# Patient Record
Sex: MALE | Race: WHITE | NOT HISPANIC OR LATINO | Employment: FULL TIME | URBAN - NONMETROPOLITAN AREA
[De-identification: names, ages, dates, MRNs, and addresses within clinical notes are randomized per-mention and may not be internally consistent; named-entity substitution may affect disease eponyms.]

---

## 2017-01-09 DIAGNOSIS — I10 ESSENTIAL HYPERTENSION: ICD-10-CM

## 2017-01-09 RX ORDER — LISINOPRIL 40 MG/1
TABLET ORAL
Qty: 30 TABLET | Refills: 0 | OUTPATIENT
Start: 2017-01-09

## 2017-01-11 DIAGNOSIS — I10 ESSENTIAL HYPERTENSION: ICD-10-CM

## 2017-01-11 RX ORDER — LISINOPRIL 40 MG/1
TABLET ORAL
Qty: 30 TABLET | Refills: 0 | OUTPATIENT
Start: 2017-01-11

## 2017-01-12 DIAGNOSIS — I10 ESSENTIAL HYPERTENSION: ICD-10-CM

## 2017-01-12 RX ORDER — LISINOPRIL 40 MG/1
40 TABLET ORAL DAILY
Qty: 30 TABLET | Refills: 2 | Status: SHIPPED | OUTPATIENT
Start: 2017-01-12 | End: 2017-03-16

## 2017-01-12 NOTE — TELEPHONE ENCOUNTER
This was refilled ×2.  Let patient know will need to be seen for further renewals.  He was requested to follow-up regarding medication changes.

## 2017-03-04 ENCOUNTER — HOSPITAL ENCOUNTER (EMERGENCY)
Facility: HOSPITAL | Age: 47
Discharge: HOME | End: 2017-03-04
Attending: EMERGENCY MEDICINE | Admitting: EMERGENCY MEDICINE

## 2017-03-04 ENCOUNTER — APPOINTMENT (OUTPATIENT)
Dept: GENERAL RADIOLOGY | Facility: HOSPITAL | Age: 47
End: 2017-03-04

## 2017-03-04 VITALS
RESPIRATION RATE: 16 BRPM | BODY MASS INDEX: 35.07 KG/M2 | WEIGHT: 245 LBS | OXYGEN SATURATION: 99 % | SYSTOLIC BLOOD PRESSURE: 137 MMHG | HEART RATE: 67 BPM | HEIGHT: 70 IN | TEMPERATURE: 97.8 F | DIASTOLIC BLOOD PRESSURE: 79 MMHG

## 2017-03-04 DIAGNOSIS — S86.002A INJURY OF LEFT ACHILLES TENDON, INITIAL ENCOUNTER: Primary | ICD-10-CM

## 2017-03-04 PROCEDURE — 73610 X-RAY EXAM OF ANKLE: CPT | Performed by: RADIOLOGY

## 2017-03-04 PROCEDURE — 99283 EMERGENCY DEPT VISIT LOW MDM: CPT

## 2017-03-04 PROCEDURE — 73610 X-RAY EXAM OF ANKLE: CPT

## 2017-03-04 RX ORDER — IBUPROFEN 800 MG/1
800 TABLET ORAL EVERY 8 HOURS PRN
Qty: 20 TABLET | Refills: 0 | Status: SHIPPED | OUTPATIENT
Start: 2017-03-04 | End: 2017-03-16 | Stop reason: SDUPTHER

## 2017-03-04 RX ORDER — HYDROCODONE BITARTRATE AND ACETAMINOPHEN 7.5; 325 MG/1; MG/1
1 TABLET ORAL EVERY 6 HOURS PRN
Qty: 10 TABLET | Refills: 0 | Status: SHIPPED | OUTPATIENT
Start: 2017-03-04 | End: 2017-03-16 | Stop reason: SDUPTHER

## 2017-03-04 NOTE — ED PROVIDER NOTES
Subjective   HPI Comments: 46-year-old male who presents to the ED today with a left ankle injury.  He states he was stepping down the stairs and felt something give out on the back of his left ankle which then caused him to fall.  About 30 minutes prior to arrival.  He denies any previous injury to this ankle he has not taken any medications for his symptoms.    Patient is a 46 y.o. male presenting with lower extremity pain.   History provided by:  Patient  Lower Extremity Issue   Location:  Ankle  Time since incident:  30 minutes  Injury: yes    Mechanism of injury comment:  Stepping down on the stairs and ankle gave out  Ankle location:  L ankle  Pain details:     Quality:  Throbbing and aching    Radiates to:  Does not radiate    Severity:  Moderate    Onset quality:  Sudden    Timing:  Constant    Progression:  Worsening  Chronicity:  New  Dislocation: no    Prior injury to area:  No  Relieved by:  Nothing  Worsened by:  Bearing weight  Ineffective treatments:  None tried  Associated symptoms: decreased ROM    Associated symptoms: no back pain, no fatigue, no fever, no itching, no muscle weakness, no neck pain, no numbness, no stiffness, no swelling and no tingling        Review of Systems   Constitutional: Negative for fatigue and fever.   HENT: Negative.    Eyes: Negative.    Respiratory: Negative.    Cardiovascular: Negative.    Gastrointestinal: Negative.    Genitourinary: Negative.    Musculoskeletal: Positive for arthralgias. Negative for back pain, neck pain and stiffness.   Skin: Negative.  Negative for itching.   Neurological: Negative.    Psychiatric/Behavioral: Negative.    All other systems reviewed and are negative.      Past Medical History   Diagnosis Date   • Arthritis      HAND AND KNEE   • Hypertension    • Poison ivy        Allergies   Allergen Reactions   • Penicillins        Past Surgical History   Procedure Laterality Date   • Hernia repair  2001     Approx.       Family History   Problem  Relation Age of Onset   • Heart disease Mother      CARDIAC DISORDER   • Hypertension Mother    • Hyperlipidemia Mother    • Heart disease Father      VALVULAR   • Diabetes Maternal Grandmother        Social History     Social History   • Marital status:      Spouse name: N/A   • Number of children: N/A   • Years of education: N/A     Social History Main Topics   • Smoking status: Never Smoker   • Smokeless tobacco: Never Used   • Alcohol use No   • Drug use: No   • Sexual activity: Not Asked     Other Topics Concern   • None     Social History Narrative           Objective   Physical Exam   Constitutional: He is oriented to person, place, and time. He appears well-developed and well-nourished. No distress.   HENT:   Head: Normocephalic and atraumatic.   Right Ear: External ear normal.   Left Ear: External ear normal.   Nose: Nose normal.   Mouth/Throat: Oropharynx is clear and moist.   Eyes: Conjunctivae and EOM are normal. Pupils are equal, round, and reactive to light.   Neck: Normal range of motion. Neck supple.   Cardiovascular: Normal rate, regular rhythm, normal heart sounds and intact distal pulses.    Pulmonary/Chest: Effort normal and breath sounds normal. No respiratory distress.   Abdominal: Soft. Bowel sounds are normal. There is no tenderness.   Musculoskeletal: He exhibits tenderness (to posterior left ankle over the achilles tendon area). He exhibits no edema or deformity.   Pt has positive Manzanares's Test on the left, consistent with possible achilles tendon rupture   Neurological: He is alert and oriented to person, place, and time.   Skin: Skin is warm and dry.   Psychiatric: He has a normal mood and affect. His behavior is normal. Judgment and thought content normal.   Nursing note and vitals reviewed.      Splint Application  Date/Time: 3/4/2017 11:19 AM  Performed by: JERI STEVEN  Authorized by: CHARITO JARA   Location details: left ankle  Splint type: ortho  boot.  Post-procedure: The splinted body part was neurovascularly unchanged following the procedure.  Patient tolerance: Patient tolerated the procedure well with no immediate complications  Comments: Fitted for crutches as well               ED Course  ED Course   Comment By Time   No acute fracture on x-ray, exam is consistent with an achilles tendon injury, pt placed in ortho boot with crutches.  Will have him follow up with ortho. HUSAM Alvares 03/04 1119                  MDM  Number of Diagnoses or Management Options  Injury of left Achilles tendon, initial encounter:      Amount and/or Complexity of Data Reviewed  Tests in the radiology section of CPT®: ordered and reviewed    Patient Progress  Patient progress: stable      Final diagnoses:   Injury of left Achilles tendon, initial encounter            HUSAM Alvares  03/04/17 1120

## 2017-03-04 NOTE — DISCHARGE INSTRUCTIONS

## 2017-03-16 ENCOUNTER — OFFICE VISIT (OUTPATIENT)
Dept: FAMILY MEDICINE CLINIC | Facility: CLINIC | Age: 47
End: 2017-03-16

## 2017-03-16 VITALS
HEART RATE: 72 BPM | SYSTOLIC BLOOD PRESSURE: 162 MMHG | WEIGHT: 257.2 LBS | TEMPERATURE: 97.7 F | HEIGHT: 70 IN | DIASTOLIC BLOOD PRESSURE: 87 MMHG | BODY MASS INDEX: 36.82 KG/M2

## 2017-03-16 DIAGNOSIS — S86.002S ACHILLES TENDON INJURY, LEFT, SEQUELA: ICD-10-CM

## 2017-03-16 DIAGNOSIS — I10 ESSENTIAL HYPERTENSION: Primary | ICD-10-CM

## 2017-03-16 LAB
ALBUMIN SERPL-MCNC: 4.2 G/DL (ref 3.5–5)
ALBUMIN/GLOB SERPL: 1.4 G/DL (ref 1.5–2.5)
ALP SERPL-CCNC: 79 U/L (ref 40–129)
ALT SERPL W P-5'-P-CCNC: 36 U/L (ref 10–44)
ANION GAP SERPL CALCULATED.3IONS-SCNC: 5.5 MMOL/L (ref 3.6–11.2)
AST SERPL-CCNC: 32 U/L (ref 10–34)
BASOPHILS # BLD AUTO: 0.02 10*3/MM3 (ref 0–0.3)
BASOPHILS NFR BLD AUTO: 0.2 % (ref 0–2)
BILIRUB SERPL-MCNC: 0.3 MG/DL (ref 0.2–1.8)
BUN BLD-MCNC: 19 MG/DL (ref 7–21)
BUN/CREAT SERPL: 16.8 (ref 7–25)
CALCIUM SPEC-SCNC: 9.3 MG/DL (ref 7.7–10)
CHLORIDE SERPL-SCNC: 106 MMOL/L (ref 99–112)
CO2 SERPL-SCNC: 30.5 MMOL/L (ref 24.3–31.9)
CREAT BLD-MCNC: 1.13 MG/DL (ref 0.43–1.29)
DEPRECATED RDW RBC AUTO: 40.5 FL (ref 37–54)
EOSINOPHIL # BLD AUTO: 0.1 10*3/MM3 (ref 0–0.7)
EOSINOPHIL NFR BLD AUTO: 1.2 % (ref 0–5)
ERYTHROCYTE [DISTWIDTH] IN BLOOD BY AUTOMATED COUNT: 12.6 % (ref 11.5–14.5)
GFR SERPL CREATININE-BSD FRML MDRD: 70 ML/MIN/1.73
GLOBULIN UR ELPH-MCNC: 2.9 GM/DL
GLUCOSE BLD-MCNC: 118 MG/DL (ref 70–110)
HCT VFR BLD AUTO: 32.8 % (ref 42–52)
HGB BLD-MCNC: 10.8 G/DL (ref 14–18)
IMM GRANULOCYTES # BLD: 0.03 10*3/MM3 (ref 0–0.03)
IMM GRANULOCYTES NFR BLD: 0.4 % (ref 0–0.5)
LYMPHOCYTES # BLD AUTO: 1.28 10*3/MM3 (ref 1–3)
LYMPHOCYTES NFR BLD AUTO: 15.6 % (ref 21–51)
MCH RBC QN AUTO: 29.3 PG (ref 27–33)
MCHC RBC AUTO-ENTMCNC: 32.9 G/DL (ref 33–37)
MCV RBC AUTO: 89.1 FL (ref 80–94)
MONOCYTES # BLD AUTO: 0.53 10*3/MM3 (ref 0.1–0.9)
MONOCYTES NFR BLD AUTO: 6.5 % (ref 0–10)
NEUTROPHILS # BLD AUTO: 6.23 10*3/MM3 (ref 1.4–6.5)
NEUTROPHILS NFR BLD AUTO: 76.1 % (ref 30–70)
OSMOLALITY SERPL CALC.SUM OF ELEC: 286.5 MOSM/KG (ref 273–305)
PLATELET # BLD AUTO: 247 10*3/MM3 (ref 130–400)
PMV BLD AUTO: 10.6 FL (ref 6–10)
POTASSIUM BLD-SCNC: 3.4 MMOL/L (ref 3.5–5.3)
PROT SERPL-MCNC: 7.1 G/DL (ref 6–8)
RBC # BLD AUTO: 3.68 10*6/MM3 (ref 4.7–6.1)
SODIUM BLD-SCNC: 142 MMOL/L (ref 135–153)
WBC NRBC COR # BLD: 8.19 10*3/MM3 (ref 4.5–12.5)

## 2017-03-16 PROCEDURE — 80053 COMPREHEN METABOLIC PANEL: CPT | Performed by: FAMILY MEDICINE

## 2017-03-16 PROCEDURE — 85025 COMPLETE CBC W/AUTO DIFF WBC: CPT | Performed by: FAMILY MEDICINE

## 2017-03-16 PROCEDURE — 99213 OFFICE O/P EST LOW 20 MIN: CPT | Performed by: FAMILY MEDICINE

## 2017-03-16 PROCEDURE — 36415 COLL VENOUS BLD VENIPUNCTURE: CPT | Performed by: FAMILY MEDICINE

## 2017-03-16 RX ORDER — LOSARTAN POTASSIUM 50 MG/1
50 TABLET ORAL DAILY
Qty: 90 TABLET | Refills: 1 | Status: SHIPPED | OUTPATIENT
Start: 2017-03-16 | End: 2017-07-27 | Stop reason: SDUPTHER

## 2017-03-16 RX ORDER — HYDROCHLOROTHIAZIDE 25 MG/1
25 TABLET ORAL DAILY
Qty: 90 TABLET | Refills: 1 | Status: SHIPPED | OUTPATIENT
Start: 2017-03-16

## 2017-03-16 RX ORDER — HYDROCODONE BITARTRATE AND ACETAMINOPHEN 7.5; 325 MG/1; MG/1
1 TABLET ORAL EVERY 8 HOURS PRN
Qty: 15 TABLET | Refills: 0
Start: 2017-03-16 | End: 2017-07-11

## 2017-03-16 RX ORDER — IBUPROFEN 800 MG/1
800 TABLET ORAL EVERY 8 HOURS PRN
Qty: 90 TABLET | Refills: 1 | OUTPATIENT
Start: 2017-03-16 | End: 2021-07-25

## 2017-03-16 NOTE — PROGRESS NOTES
"Subjective   Romaine Pina is a 46 y.o. male.     History of Present Illness .  Follow-up regarding hypertension medication management.  Unfortunately has experienced a left Achilles injury necessitated ER visit orthopedic referral just had MRI results pending.  Has been using ibuprofen.  Did lose prescription that orthopedist gave.  Desires to have some stronger pain medicine.  Is using a walking boot.  Has had no chest CDV GI  concerns.  Continues to be gainfully employed self as a .    The following portions of the patient's history were reviewed and updated as appropriate: allergies, current medications, past medical history, past social history, past surgical history and problem list.    Review of Systems the the history of present illness    Objective   Physical Exam   Constitutional: He is oriented to person, place, and time. He appears well-developed and well-nourished.   HENT:   Head: Normocephalic.   Mouth/Throat: Oropharynx is clear and moist.   Eyes: Conjunctivae and EOM are normal. Pupils are equal, round, and reactive to light.   Neck: Normal range of motion. Neck supple. No tracheal deviation present. No thyromegaly present.   Cardiovascular: Normal rate, regular rhythm and normal heart sounds.    No murmur heard.  Pulmonary/Chest: Effort normal and breath sounds normal.   Musculoskeletal:   Walking boot left ankle   Lymphadenopathy:     He has no cervical adenopathy.   Neurological: He is alert and oriented to person, place, and time.   Psychiatric: He has a normal mood and affect.   Vitals reviewed.    Visit Vitals   • /87 (BP Location: Left arm, Patient Position: Sitting)   • Pulse 72   • Temp 97.7 °F (36.5 °C) (Oral)   • Ht 70\" (177.8 cm)   • Wt 257 lb 3.2 oz (117 kg)   • BMI 36.9 kg/m2     Assessment/Plan   Romaine was seen today for hypertension and med refill.    Diagnoses and all orders for this visit:    Essential hypertension  -     losartan (COZAAR) 50 MG tablet; " Take 1 tablet by mouth Daily.  -     hydrochlorothiazide (HYDRODIURIL) 25 MG tablet; Take 1 tablet by mouth Daily.  -     Comprehensive Metabolic Panel  -     CBC & Differential  -     CBC Auto Differential    Achilles tendon injury, left, sequela  -     ibuprofen (ADVIL,MOTRIN) 800 MG tablet; Take 1 tablet by mouth Every 8 (Eight) Hours As Needed for Mild Pain (1-3).  -     HYDROcodone-acetaminophen (NORCO) 7.5-325 MG per tablet; Take 1 tablet by mouth Every 8 (Eight) Hours As Needed for Severe Pain (7-10).      Will check labs.  BP borderline  Will DC lisinopril.  Start losartan 50 mg daily continue HCTZ daily.  Continue ibuprofen.  Gave hydrocodone No. 15.  Keep visit with orthopedist.  Maintain heart healthy diet.  Recheck in 4 months.  Will notify with lab results.  SAUL reviewed.

## 2017-03-23 ENCOUNTER — OFFICE VISIT (OUTPATIENT)
Dept: FAMILY MEDICINE CLINIC | Facility: CLINIC | Age: 47
End: 2017-03-23

## 2017-03-23 VITALS
TEMPERATURE: 97.5 F | HEART RATE: 54 BPM | BODY MASS INDEX: 36.22 KG/M2 | WEIGHT: 253 LBS | DIASTOLIC BLOOD PRESSURE: 86 MMHG | HEIGHT: 70 IN | SYSTOLIC BLOOD PRESSURE: 148 MMHG

## 2017-03-23 DIAGNOSIS — I10 ESSENTIAL HYPERTENSION: ICD-10-CM

## 2017-03-23 DIAGNOSIS — D64.9 ANEMIA, UNSPECIFIED TYPE: Primary | ICD-10-CM

## 2017-03-23 LAB
ANION GAP SERPL CALCULATED.3IONS-SCNC: 5.7 MMOL/L (ref 3.6–11.2)
BUN BLD-MCNC: 24 MG/DL (ref 7–21)
BUN/CREAT SERPL: 23.3 (ref 7–25)
CALCIUM SPEC-SCNC: 9.4 MG/DL (ref 7.7–10)
CHLORIDE SERPL-SCNC: 107 MMOL/L (ref 99–112)
CO2 SERPL-SCNC: 32.3 MMOL/L (ref 24.3–31.9)
CREAT BLD-MCNC: 1.03 MG/DL (ref 0.43–1.29)
FERRITIN SERPL-MCNC: 88 NG/ML (ref 21.9–321.7)
FOLATE SERPL-MCNC: 11.12 NG/ML (ref 5.4–20)
GFR SERPL CREATININE-BSD FRML MDRD: 78 ML/MIN/1.73
GLUCOSE BLD-MCNC: 86 MG/DL (ref 70–110)
IRON 24H UR-MRATE: 73 MCG/DL (ref 53–167)
IRON SATN MFR SERPL: 20 % (ref 20–50)
OSMOLALITY SERPL CALC.SUM OF ELEC: 292 MOSM/KG (ref 273–305)
POTASSIUM BLD-SCNC: 3.7 MMOL/L (ref 3.5–5.3)
SODIUM BLD-SCNC: 145 MMOL/L (ref 135–153)
TIBC SERPL-MCNC: 360 MCG/DL (ref 241–421)
TSH SERPL DL<=0.05 MIU/L-ACNC: 1.87 MIU/ML (ref 0.55–4.78)
VIT B12 BLD-MCNC: 368 PG/ML (ref 211–911)

## 2017-03-23 PROCEDURE — 82607 VITAMIN B-12: CPT | Performed by: FAMILY MEDICINE

## 2017-03-23 PROCEDURE — 99213 OFFICE O/P EST LOW 20 MIN: CPT | Performed by: FAMILY MEDICINE

## 2017-03-23 PROCEDURE — 36415 COLL VENOUS BLD VENIPUNCTURE: CPT | Performed by: FAMILY MEDICINE

## 2017-03-23 PROCEDURE — 83540 ASSAY OF IRON: CPT | Performed by: FAMILY MEDICINE

## 2017-03-23 PROCEDURE — 82728 ASSAY OF FERRITIN: CPT | Performed by: FAMILY MEDICINE

## 2017-03-23 PROCEDURE — 83550 IRON BINDING TEST: CPT | Performed by: FAMILY MEDICINE

## 2017-03-23 PROCEDURE — 80048 BASIC METABOLIC PNL TOTAL CA: CPT | Performed by: FAMILY MEDICINE

## 2017-03-23 PROCEDURE — 82746 ASSAY OF FOLIC ACID SERUM: CPT | Performed by: FAMILY MEDICINE

## 2017-03-23 PROCEDURE — 84443 ASSAY THYROID STIM HORMONE: CPT | Performed by: FAMILY MEDICINE

## 2017-03-23 NOTE — PROGRESS NOTES
"Subjective   Romaine Pina is a 46 y.o. male.     History of Present Illness follow-up to review recent lab check BP follow-up.  Having no new problems.  Tolerating medications.  Staying very active at work.  Maintaining follow-up with orthopedist.  Has had no palpitations shortness of breath chest pain dizziness.    The following portions of the patient's history were reviewed and updated as appropriate: allergies, current medications, past family history, past social history, past surgical history and problem list.    Review of Systems   Constitutional: Negative.    HENT: Negative.    Eyes: Negative.    Respiratory: Negative.    Cardiovascular: Negative.    Gastrointestinal: Negative.    Endocrine: Negative.    Genitourinary: Negative.    Skin: Negative.    Allergic/Immunologic: Negative.    Neurological: Negative.    Hematological: Negative.    Psychiatric/Behavioral: Negative.        Objective   Physical Exam   Constitutional: He is oriented to person, place, and time. He appears well-developed and well-nourished.   HENT:   Head: Normocephalic.   Cardiovascular: Normal rate, regular rhythm and normal heart sounds.    No murmur heard.  Pulmonary/Chest: Effort normal and breath sounds normal.   Neurological: He is alert and oriented to person, place, and time.   Psychiatric: He has a normal mood and affect.   Vitals reviewed.    /86 (BP Location: Left arm, Patient Position: Sitting)  Pulse 54  Temp 97.5 °F (36.4 °C) (Oral)   Ht 70\" (177.8 cm)  Wt 253 lb (115 kg)  BMI 36.3 kg/m2  Assessment/Plan   Romaine was seen today for hypertension.    Diagnoses and all orders for this visit:    Anemia, unspecified type  -     Ferritin  -     Folate  -     Iron Profile  -     Vitamin B12  -     TSH  -     POC Occult Blood X 3, Stool; Future    Essential hypertension  -     Basic Metabolic Panel    In regards to the hypertension will continue all medication as noted.  The mild hyperkalemia will be " rechecked.  In regards to the anemia will pursue with further evaluation check stool for occult blood.  Anticipate the likelihood of endoscopy in near future.  Will notify with results.  Follow-up recommendations pending.

## 2017-04-07 ENCOUNTER — APPOINTMENT (OUTPATIENT)
Dept: PREADMISSION TESTING | Facility: HOSPITAL | Age: 47
End: 2017-04-07

## 2017-04-18 ENCOUNTER — APPOINTMENT (OUTPATIENT)
Dept: CT IMAGING | Facility: HOSPITAL | Age: 47
End: 2017-04-18

## 2017-04-18 ENCOUNTER — HOSPITAL ENCOUNTER (EMERGENCY)
Facility: HOSPITAL | Age: 47
Discharge: HOME | End: 2017-04-18
Admitting: NURSE PRACTITIONER

## 2017-04-18 ENCOUNTER — APPOINTMENT (OUTPATIENT)
Dept: GENERAL RADIOLOGY | Facility: HOSPITAL | Age: 47
End: 2017-04-18

## 2017-04-18 VITALS
SYSTOLIC BLOOD PRESSURE: 137 MMHG | RESPIRATION RATE: 18 BRPM | TEMPERATURE: 98.2 F | WEIGHT: 250 LBS | HEART RATE: 60 BPM | HEIGHT: 71 IN | OXYGEN SATURATION: 100 % | BODY MASS INDEX: 35 KG/M2 | DIASTOLIC BLOOD PRESSURE: 78 MMHG

## 2017-04-18 DIAGNOSIS — J18.9 ATYPICAL PNEUMONIA: Primary | ICD-10-CM

## 2017-04-18 LAB
ALBUMIN SERPL-MCNC: 4.6 G/DL (ref 3.5–5)
ALBUMIN/GLOB SERPL: 1.5 G/DL (ref 1.5–2.5)
ALP SERPL-CCNC: 75 U/L (ref 40–129)
ALT SERPL W P-5'-P-CCNC: 38 U/L (ref 10–44)
ANION GAP SERPL CALCULATED.3IONS-SCNC: 2.3 MMOL/L (ref 3.6–11.2)
AST SERPL-CCNC: 28 U/L (ref 10–34)
BASOPHILS # BLD AUTO: 0.02 10*3/MM3 (ref 0–0.3)
BASOPHILS NFR BLD AUTO: 0.3 % (ref 0–2)
BILIRUB SERPL-MCNC: 0.4 MG/DL (ref 0.2–1.8)
BUN BLD-MCNC: 16 MG/DL (ref 7–21)
BUN/CREAT SERPL: 19.5 (ref 7–25)
CALCIUM SPEC-SCNC: 10.7 MG/DL (ref 7.7–10)
CHLORIDE SERPL-SCNC: 103 MMOL/L (ref 99–112)
CO2 SERPL-SCNC: 34.7 MMOL/L (ref 24.3–31.9)
CREAT BLD-MCNC: 0.82 MG/DL (ref 0.43–1.29)
D DIMER PPP FEU-MCNC: 0.65 MG/L (FEU) (ref 0–0.5)
DEPRECATED RDW RBC AUTO: 41.8 FL (ref 37–54)
EOSINOPHIL # BLD AUTO: 0.21 10*3/MM3 (ref 0–0.7)
EOSINOPHIL NFR BLD AUTO: 3.2 % (ref 0–5)
ERYTHROCYTE [DISTWIDTH] IN BLOOD BY AUTOMATED COUNT: 12.9 % (ref 11.5–14.5)
GFR SERPL CREATININE-BSD FRML MDRD: 101 ML/MIN/1.73
GLOBULIN UR ELPH-MCNC: 3.1 GM/DL
GLUCOSE BLD-MCNC: 85 MG/DL (ref 70–110)
HCT VFR BLD AUTO: 39.2 % (ref 42–52)
HGB BLD-MCNC: 12.7 G/DL (ref 14–18)
IMM GRANULOCYTES # BLD: 0.02 10*3/MM3 (ref 0–0.03)
IMM GRANULOCYTES NFR BLD: 0.3 % (ref 0–0.5)
LYMPHOCYTES # BLD AUTO: 1.38 10*3/MM3 (ref 1–3)
LYMPHOCYTES NFR BLD AUTO: 20.7 % (ref 21–51)
MCH RBC QN AUTO: 29.1 PG (ref 27–33)
MCHC RBC AUTO-ENTMCNC: 32.4 G/DL (ref 33–37)
MCV RBC AUTO: 89.9 FL (ref 80–94)
MONOCYTES # BLD AUTO: 0.55 10*3/MM3 (ref 0.1–0.9)
MONOCYTES NFR BLD AUTO: 8.3 % (ref 0–10)
NEUTROPHILS # BLD AUTO: 4.48 10*3/MM3 (ref 1.4–6.5)
NEUTROPHILS NFR BLD AUTO: 67.2 % (ref 30–70)
OSMOLALITY SERPL CALC.SUM OF ELEC: 279.8 MOSM/KG (ref 273–305)
PLATELET # BLD AUTO: 241 10*3/MM3 (ref 130–400)
PMV BLD AUTO: 10 FL (ref 6–10)
POTASSIUM BLD-SCNC: 4 MMOL/L (ref 3.5–5.3)
PROT SERPL-MCNC: 7.7 G/DL (ref 6–8)
RBC # BLD AUTO: 4.36 10*6/MM3 (ref 4.7–6.1)
SODIUM BLD-SCNC: 140 MMOL/L (ref 135–153)
TROPONIN I SERPL-MCNC: <0.006 NG/ML
WBC NRBC COR # BLD: 6.66 10*3/MM3 (ref 4.5–12.5)

## 2017-04-18 PROCEDURE — 85025 COMPLETE CBC W/AUTO DIFF WBC: CPT | Performed by: NURSE PRACTITIONER

## 2017-04-18 PROCEDURE — 71275 CT ANGIOGRAPHY CHEST: CPT

## 2017-04-18 PROCEDURE — 85379 FIBRIN DEGRADATION QUANT: CPT | Performed by: NURSE PRACTITIONER

## 2017-04-18 PROCEDURE — 84484 ASSAY OF TROPONIN QUANT: CPT | Performed by: NURSE PRACTITIONER

## 2017-04-18 PROCEDURE — 0 IOPAMIDOL PER 1 ML: Performed by: NURSE PRACTITIONER

## 2017-04-18 PROCEDURE — 99284 EMERGENCY DEPT VISIT MOD MDM: CPT

## 2017-04-18 PROCEDURE — 36415 COLL VENOUS BLD VENIPUNCTURE: CPT

## 2017-04-18 PROCEDURE — 80053 COMPREHEN METABOLIC PANEL: CPT | Performed by: NURSE PRACTITIONER

## 2017-04-18 PROCEDURE — 93005 ELECTROCARDIOGRAM TRACING: CPT | Performed by: NURSE PRACTITIONER

## 2017-04-18 PROCEDURE — 71275 CT ANGIOGRAPHY CHEST: CPT | Performed by: RADIOLOGY

## 2017-04-18 PROCEDURE — 71020 XR CHEST 2 VW: CPT | Performed by: RADIOLOGY

## 2017-04-18 PROCEDURE — 71020 HC CHEST PA AND LATERAL: CPT

## 2017-04-18 PROCEDURE — 93010 ELECTROCARDIOGRAM REPORT: CPT | Performed by: INTERNAL MEDICINE

## 2017-04-18 RX ORDER — SODIUM CHLORIDE 0.9 % (FLUSH) 0.9 %
10 SYRINGE (ML) INJECTION AS NEEDED
Status: DISCONTINUED | OUTPATIENT
Start: 2017-04-18 | End: 2017-04-18 | Stop reason: HOSPADM

## 2017-04-18 RX ORDER — AZITHROMYCIN 250 MG/1
250 TABLET, FILM COATED ORAL DAILY
Qty: 6 TABLET | Refills: 0 | Status: SHIPPED | OUTPATIENT
Start: 2017-04-18 | End: 2017-07-11

## 2017-04-18 RX ORDER — HYDROCODONE BITARTRATE AND ACETAMINOPHEN 7.5; 325 MG/1; MG/1
1 TABLET ORAL ONCE
Status: COMPLETED | OUTPATIENT
Start: 2017-04-18 | End: 2017-04-18

## 2017-04-18 RX ORDER — HYDROCODONE BITARTRATE AND ACETAMINOPHEN 7.5; 325 MG/1; MG/1
TABLET ORAL
Status: COMPLETED
Start: 2017-04-18 | End: 2017-04-18

## 2017-04-18 RX ADMIN — HYDROCODONE BITARTRATE AND ACETAMINOPHEN 1 TABLET: 7.5; 325 TABLET ORAL at 10:37

## 2017-04-18 RX ADMIN — IOPAMIDOL 100 ML: 755 INJECTION, SOLUTION INTRAVENOUS at 13:45

## 2017-04-18 NOTE — ED NOTES
Pt reports having an achilles tendon repair done on Friday by Dr. Watts; pt reports he began to experience shortness of breath, dizziness, and profuse sweating upon exertion yesterday and today. Pt reports he called Dr. Watts's office and was instructed to come be evaluated for DVT/PE. Pt denies presence of chest pain at this time; neurovascular status remains intact to the LLE-pt skin remains pink, pt has no loss of sensation, cap refill less than 3 at this time.      Sanket Hassan RN  04/18/17 4004

## 2017-04-18 NOTE — ED NOTES
Consent for IV contrast obtained at this time via patient     Sanket Hassan, JOVANNI  04/18/17 3848

## 2017-07-11 ENCOUNTER — OFFICE VISIT (OUTPATIENT)
Dept: FAMILY MEDICINE CLINIC | Facility: CLINIC | Age: 47
End: 2017-07-11

## 2017-07-11 VITALS
TEMPERATURE: 98 F | HEART RATE: 64 BPM | DIASTOLIC BLOOD PRESSURE: 95 MMHG | HEIGHT: 71 IN | SYSTOLIC BLOOD PRESSURE: 155 MMHG | BODY MASS INDEX: 35.76 KG/M2 | WEIGHT: 255.4 LBS

## 2017-07-11 DIAGNOSIS — I10 ESSENTIAL HYPERTENSION: Primary | ICD-10-CM

## 2017-07-11 DIAGNOSIS — R07.9 CHEST PAIN OF UNCERTAIN ETIOLOGY: ICD-10-CM

## 2017-07-11 PROCEDURE — 99213 OFFICE O/P EST LOW 20 MIN: CPT | Performed by: FAMILY MEDICINE

## 2017-07-11 RX ORDER — AMLODIPINE BESYLATE 5 MG/1
5 TABLET ORAL DAILY
Qty: 30 TABLET | Refills: 3 | Status: SHIPPED | OUTPATIENT
Start: 2017-07-11 | End: 2017-08-03 | Stop reason: SDUPTHER

## 2017-07-12 PROBLEM — Z98.890: Status: ACTIVE | Noted: 2017-07-12

## 2017-07-12 NOTE — PROGRESS NOTES
"Subjective   Romaine Pina is a 47 y.o. male.     History of Present Illness follow-up regarding hypertension.  Has had also a few episodes of nonexertional chest discomfort.  Associated somewhat with increased psycho social stress.  No associated dyspnea diaphoresis palpitations.  Has had significant past history change in light of the surgical repair of the left Achilles tendon followed by surgical wound infection.  Had to undergo second surgery.  Was on oral antibiotics for extended time doxycycline.  Has been on extended limited activity.  Has follow-up with surgeon in a.m.  Blood pressure has been fluctuating.  Some headaches.    The following portions of the patient's history were reviewed and updated as appropriate: allergies, current medications, past medical history, past social history, past surgical history and problem list.    Review of Systems see the history of present illness    Objective   Physical Exam   Constitutional: He is oriented to person, place, and time. He appears well-developed and well-nourished.   HENT:   Head: Normocephalic.   Mouth/Throat: Oropharynx is clear and moist.   Eyes: Conjunctivae and EOM are normal. Pupils are equal, round, and reactive to light.   Neck: Normal range of motion. Neck supple.   Cardiovascular: Normal rate, regular rhythm and normal heart sounds.    No murmur heard.  Pulmonary/Chest: Effort normal and breath sounds normal.   Neurological: He is alert and oriented to person, place, and time.   Psychiatric: He has a normal mood and affect.   Vitals reviewed.    /95 (BP Location: Left arm, Patient Position: Sitting)  Pulse 64  Temp 98 °F (36.7 °C) (Oral)   Ht 70.5\" (179.1 cm)  Wt 255 lb 6.4 oz (116 kg)  BMI 36.13 kg/m2  Assessment/Plan   Romaine was seen today for hypertension.    Diagnoses and all orders for this visit:    Essential hypertension  -     amLODIPine (NORVASC) 5 MG tablet; Take 1 tablet by mouth Daily.  -     Ambulatory Referral " to Cardiology    Chest pain of uncertain etiology  -     Ambulatory Referral to Cardiology    Other orders  -     aspirin 81 MG tablet; Take 1 tablet by mouth Daily.     In regards to the noncontrolled hypertension will add low-dose amlodipine.  Start baby aspirin daily.  Will ask you to utilize ER if chest pain symptoms were to recur.  Will make referral to cardiology you have seen in past.  We reviewed studies from the hospital admissions that are available.  I note incidental finding of probable gallstones.  That is a potential culprit for some of the chest symptoms you have experienced.  Keep follow-up with surgeon.  I am not enthusiastic about you being active for a while on this ankle.  Recent hospital labs noted.

## 2017-07-27 ENCOUNTER — APPOINTMENT (OUTPATIENT)
Dept: CT IMAGING | Facility: HOSPITAL | Age: 47
End: 2017-07-27

## 2017-07-27 ENCOUNTER — HOSPITAL ENCOUNTER (EMERGENCY)
Facility: HOSPITAL | Age: 47
Discharge: HOME | End: 2017-07-27
Admitting: EMERGENCY MEDICINE

## 2017-07-27 ENCOUNTER — OFFICE VISIT (OUTPATIENT)
Dept: CARDIOLOGY | Facility: CLINIC | Age: 47
End: 2017-07-27

## 2017-07-27 VITALS
HEIGHT: 71 IN | OXYGEN SATURATION: 6 % | RESPIRATION RATE: 16 BRPM | HEART RATE: 68 BPM | DIASTOLIC BLOOD PRESSURE: 73 MMHG | TEMPERATURE: 98.1 F | BODY MASS INDEX: 35.7 KG/M2 | WEIGHT: 255 LBS | SYSTOLIC BLOOD PRESSURE: 118 MMHG

## 2017-07-27 VITALS
HEIGHT: 71 IN | OXYGEN SATURATION: 98 % | SYSTOLIC BLOOD PRESSURE: 132 MMHG | BODY MASS INDEX: 36.12 KG/M2 | WEIGHT: 258 LBS | HEART RATE: 65 BPM | DIASTOLIC BLOOD PRESSURE: 70 MMHG

## 2017-07-27 DIAGNOSIS — E66.9 OBESITY (BMI 35.0-39.9 WITHOUT COMORBIDITY): ICD-10-CM

## 2017-07-27 DIAGNOSIS — R53.82 CHRONIC FATIGUE: ICD-10-CM

## 2017-07-27 DIAGNOSIS — I10 ESSENTIAL HYPERTENSION: Primary | ICD-10-CM

## 2017-07-27 DIAGNOSIS — M54.2 NECK PAIN: Primary | ICD-10-CM

## 2017-07-27 DIAGNOSIS — R07.9 CHEST PAIN IN ADULT: ICD-10-CM

## 2017-07-27 DIAGNOSIS — D64.9 ANEMIA, UNSPECIFIED TYPE: ICD-10-CM

## 2017-07-27 PROCEDURE — 99283 EMERGENCY DEPT VISIT LOW MDM: CPT

## 2017-07-27 PROCEDURE — 25010000002 KETOROLAC TROMETHAMINE PER 15 MG: Performed by: NURSE PRACTITIONER

## 2017-07-27 PROCEDURE — 96372 THER/PROPH/DIAG INJ SC/IM: CPT

## 2017-07-27 PROCEDURE — 93000 ELECTROCARDIOGRAM COMPLETE: CPT | Performed by: INTERNAL MEDICINE

## 2017-07-27 PROCEDURE — 72125 CT NECK SPINE W/O DYE: CPT | Performed by: RADIOLOGY

## 2017-07-27 PROCEDURE — 72125 CT NECK SPINE W/O DYE: CPT

## 2017-07-27 PROCEDURE — 99204 OFFICE O/P NEW MOD 45 MIN: CPT | Performed by: INTERNAL MEDICINE

## 2017-07-27 RX ORDER — ACETAMINOPHEN AND CODEINE PHOSPHATE 300; 30 MG/1; MG/1
1 TABLET ORAL EVERY 4 HOURS PRN
Qty: 15 TABLET | Refills: 0 | OUTPATIENT
Start: 2017-07-27 | End: 2021-07-25

## 2017-07-27 RX ORDER — CYCLOBENZAPRINE HCL 10 MG
10 TABLET ORAL 3 TIMES DAILY PRN
Qty: 21 TABLET | Refills: 0 | Status: SHIPPED | OUTPATIENT
Start: 2017-07-27 | End: 2017-08-09

## 2017-07-27 RX ORDER — KETOROLAC TROMETHAMINE 30 MG/ML
60 INJECTION, SOLUTION INTRAMUSCULAR; INTRAVENOUS ONCE
Status: COMPLETED | OUTPATIENT
Start: 2017-07-27 | End: 2017-07-27

## 2017-07-27 RX ORDER — LOSARTAN POTASSIUM 50 MG/1
50 TABLET ORAL DAILY
Qty: 90 TABLET | Refills: 1 | Status: SHIPPED | OUTPATIENT
Start: 2017-07-27

## 2017-07-27 RX ORDER — SULFAMETHOXAZOLE AND TRIMETHOPRIM 800; 160 MG/1; MG/1
1 TABLET ORAL 2 TIMES DAILY
COMMUNITY
End: 2021-07-25

## 2017-07-27 RX ADMIN — KETOROLAC TROMETHAMINE 60 MG: 60 INJECTION, SOLUTION INTRAMUSCULAR at 15:12

## 2017-07-27 NOTE — PROGRESS NOTES
Subjective   Chief Complaint   Patient presents with   • New Patient   • Hypertension   Patient is 47 years old white male who is here because of her difficult to control blood pressure.  Patient is taking hydrochlorothiazide 25 mg daily, Norvasc 5 mg daily and Cozaar 50 mg twice a day.  Blood pressure is running better but it is still running little bit high.    Chest pain  Patient recently had a chest pain and was seen [facility] on 7/16/2017.  Chest pain was felt to be atypical.  Echocardiogram was technically difficult and poor quality.  LV ejection fraction is reported as 55% and grade 1 diastolic dysfunction was noted.  Left atrium mild to moderately dilated.  Mild diffuse thickening of the mitral valve.  Aortic valve not visualized by Doppler there is no evidence of significant mitral or aortic regurgitation.  Tricuspid and pulmonic valves were not visualized.  There is no pericardial effusion noted.  It was no evidence of DVT.  Patient had left Achilles tendon repair and then was discharged home.  Troponin on 4/18/2017 was normal    He states that he has vague sharp chest pain left anterior chest and the left shoulder.  It is not related to exertion.  Patient stated that isn't a lot of stress and has been having stress headaches.  May be due to high blood pressure.    Patient has no diabetes mellitus.  No history of hyperlipidemia but no lipid level is available.  Patient is overweight and has mild anemia.    Last stress test in 2012 showed hypertensive response to exercise.  Patient states the stress test was negative for ischemia.    History of Present Illness      Past Surgical History:   Procedure Laterality Date   • ACHILLES TENDON REPAIR Left 04/14/2017   • HERNIA REPAIR  2001    Approx.     Family History   Problem Relation Age of Onset   • Heart disease Mother      CARDIAC DISORDER   • Hypertension Mother    • Hyperlipidemia Mother    • Heart disease Father      VALVULAR   • Diabetes Maternal  Grandmother      Past Medical History:   Diagnosis Date   • Arthritis     HAND AND KNEE   • Hypertension    • Poison ivy        Patient Active Problem List   Diagnosis   • Impotence of organic origin   • Essential hypertension   • Hx of Achilles tendon repair   • Anemia   • Chronic fatigue   • Obesity (BMI 35.0-39.9 without comorbidity)   • Chest pain in adult         Social History   Substance Use Topics   • Smoking status: Never Smoker   • Smokeless tobacco: Never Used   • Alcohol use No         The following portions of the patient's history were reviewed and updated as appropriate: allergies, current medications, past family history, past medical history, past social history, past surgical history and problem list.    Allergies   Allergen Reactions   • Penicillins      UNKNOWN         Current Outpatient Prescriptions:   •  amLODIPine (NORVASC) 5 MG tablet, Take 1 tablet by mouth Daily., Disp: 30 tablet, Rfl: 3  •  aspirin 81 MG tablet, Take 1 tablet by mouth Daily., Disp: , Rfl:   •  CHLORHEXIDINE GLUCONATE CLOTH EX, Apply  topically., Disp: , Rfl:   •  hydrochlorothiazide (HYDRODIURIL) 25 MG tablet, Take 1 tablet by mouth Daily., Disp: 90 tablet, Rfl: 1  •  ibuprofen (ADVIL,MOTRIN) 800 MG tablet, Take 1 tablet by mouth Every 8 (Eight) Hours As Needed for Mild Pain (1-3)., Disp: 90 tablet, Rfl: 1  •  losartan (COZAAR) 50 MG tablet, Take 1 tablet by mouth Daily. (Patient taking differently: Take 100 mg by mouth Daily.), Disp: 90 tablet, Rfl: 1  •  acetaminophen-codeine (TYLENOL #3) 300-30 MG per tablet, Take 1 tablet by mouth Every 4 (Four) Hours As Needed for Moderate Pain (4-6)., Disp: 15 tablet, Rfl: 0  •  cyclobenzaprine (FLEXERIL) 10 MG tablet, Take 1 tablet by mouth 3 (Three) Times a Day As Needed for Muscle Spasms., Disp: 21 tablet, Rfl: 0  •  sulfamethoxazole-trimethoprim (BACTRIM DS,SEPTRA DS) 800-160 MG per tablet, Take 1 tablet by mouth 2 (Two) Times a Day., Disp: , Rfl:   No current  "facility-administered medications for this visit.     Review of Systems   Constitution: Negative.   HENT: Negative.  Negative for congestion and headaches.    Eyes: Negative.    Cardiovascular: Positive for chest pain. Negative for claudication, cyanosis, dyspnea on exertion, irregular heartbeat, leg swelling, near-syncope, orthopnea, palpitations, paroxysmal nocturnal dyspnea and syncope.   Respiratory: Negative.  Negative for shortness of breath.    Endocrine: Negative.    Hematologic/Lymphatic: Negative.    Skin: Negative.    Musculoskeletal: Positive for arthritis.        Recent Achilles tendon repair   Gastrointestinal: Negative.    Neurological: Negative.         Objective      /70 (BP Location: Left arm, Patient Position: Sitting, Cuff Size: Adult)  Pulse 65  Ht 71\" (180.3 cm)  Wt 258 lb (117 kg)  SpO2 98%  BMI 35.98 kg/m2    Physical Exam   Constitutional: He appears well-developed and well-nourished.   HENT:   Head: Normocephalic and atraumatic.   Mouth/Throat: Oropharynx is clear and moist.   Eyes: Conjunctivae and EOM are normal. Pupils are equal, round, and reactive to light. No scleral icterus.   Neck: Normal range of motion. Neck supple. No JVD present. No tracheal deviation present. No thyromegaly present.   Cardiovascular: Normal rate, regular rhythm, normal heart sounds and intact distal pulses.  Exam reveals no friction rub.    No murmur heard.  Pulmonary/Chest: Effort normal and breath sounds normal. No respiratory distress. He has no wheezes. He has no rales. He exhibits no tenderness.   Abdominal: Soft. Bowel sounds are normal. He exhibits no distension and no mass. There is no tenderness. There is no rebound and no guarding.   Musculoskeletal: Normal range of motion. He exhibits no edema, tenderness or deformity.   Lymphadenopathy:     He has no cervical adenopathy.   Neurological: He is alert. He has normal reflexes. No cranial nerve deficit. He exhibits normal muscle tone. " Coordination normal.   Skin: Skin is warm and dry.   Psychiatric: He has a normal mood and affect. His behavior is normal. Judgment and thought content normal.       Lab Review:    Lab Results   Component Value Date     04/18/2017    K 4.0 04/18/2017     04/18/2017    BUN 16 04/18/2017    CREATININE 0.82 04/18/2017    GLUCOSE 85 04/18/2017    CALCIUM 10.7 (H) 04/18/2017    ALT 38 04/18/2017    ALKPHOS 75 04/18/2017    LABIL2 1.5 04/18/2017     No results found for: CKTOTAL  Lab Results   Component Value Date    WBC 6.66 04/18/2017    HGB 12.7 (L) 04/18/2017    HCT 39.2 (L) 04/18/2017     04/18/2017     Lab Results   Component Value Date    INR 0.90 03/22/2015         ECG 12 Lead  Date/Time: 7/28/2017 1:34 PM  Performed by: TRAN YOUSIF  Authorized by: TRAN YOUSIF   Rhythm: sinus rhythm  Rate: normal  Conduction: non-specific intraventricular conduction delay  ST Segments: ST segments normal  T Waves: T waves normal  QRS axis: normal  Other: no other findings  Clinical impression: abnormal ECG            I reviewed the patient's new clinical results.  I personally viewed and interpreted the patient's EKG/lab data        Assessment:   Diagnosis Plan   1. Essential hypertension  losartan (COZAAR) 50 MG tablet    ECG 12 Lead   2. Anemia, unspecified type  Iron   3. Chronic fatigue  Lipid Panel    Vitamin B12   4. Obesity (BMI 35.0-39.9 without comorbidity)     5. Chest pain in adult  ECG 12 Lead          Plan:    Blood pressure today is very good.  He will continue current medications.  Lab work was scheduled including iron lipid panel and B12.  Weight loss was stressed.  A stress test was scheduled for further evaluation.  Patient will be reevaluated in 2 weeks.      Return in about 2 weeks (around 8/10/2017).

## 2017-07-27 NOTE — ED PROVIDER NOTES
Subjective   Patient is a 47 y.o. male presenting with neck pain.   History provided by:  Patient   used: No    Neck Pain   Pain location:  L side  Quality:  Shooting  Pain radiates to:  L arm  Pain severity:  Moderate  Pain is:  Same all the time  Onset quality:  Gradual  Duration:  6 months  Timing:  Intermittent  Progression:  Waxing and waning  Chronicity:  Recurrent  Context: not fall, not jumping from heights, not lifting a heavy object, not MCA, not MVC and not pedestrian accident    Relieved by:  Nothing  Worsened by:  Nothing  Ineffective treatments:  None tried  Associated symptoms: no bladder incontinence, no bowel incontinence, no chest pain, no leg pain, no numbness, no paresis, no photophobia, no syncope, no tingling, no visual change and no weakness    Risk factors: no hx of head and neck radiation, no hx of osteoporosis, no hx of spinal trauma, no recent epidural, no recent head injury and no recurrent falls        Review of Systems   Constitutional: Negative.    HENT: Negative.    Eyes: Negative.  Negative for photophobia.   Respiratory: Negative.    Cardiovascular: Negative for chest pain and syncope.   Gastrointestinal: Negative.  Negative for bowel incontinence.   Endocrine: Negative.    Genitourinary: Negative.  Negative for bladder incontinence.   Musculoskeletal: Positive for neck pain.   Skin: Negative.    Allergic/Immunologic: Negative.    Neurological: Negative.  Negative for tingling, weakness and numbness.   Hematological: Negative.    Psychiatric/Behavioral: Negative.        Past Medical History:   Diagnosis Date   • Arthritis     HAND AND KNEE   • Hypertension    • Poison ivy        Allergies   Allergen Reactions   • Penicillins      UNKNOWN       Past Surgical History:   Procedure Laterality Date   • ACHILLES TENDON REPAIR Left 04/14/2017   • HERNIA REPAIR  2001    Approx.       Family History   Problem Relation Age of Onset   • Heart disease Mother      CARDIAC  DISORDER   • Hypertension Mother    • Hyperlipidemia Mother    • Heart disease Father      VALVULAR   • Diabetes Maternal Grandmother        Social History     Social History   • Marital status:      Spouse name: N/A   • Number of children: N/A   • Years of education: N/A     Social History Main Topics   • Smoking status: Never Smoker   • Smokeless tobacco: Never Used   • Alcohol use No   • Drug use: No   • Sexual activity: Not Asked     Other Topics Concern   • None     Social History Narrative           Objective   Physical Exam   Constitutional: He is oriented to person, place, and time. He appears well-developed and well-nourished.   HENT:   Head: Normocephalic.   Right Ear: External ear normal.   Left Ear: External ear normal.   Mouth/Throat: Oropharynx is clear and moist.   Eyes: EOM are normal. Pupils are equal, round, and reactive to light.   Neck: Normal range of motion. Neck supple.   Cardiovascular: Normal rate and regular rhythm.    Pulmonary/Chest: Effort normal and breath sounds normal.   Abdominal: Soft. Bowel sounds are normal.   Musculoskeletal:   Tender left side posterior neck; pain with ROM   Neurological: He is alert and oriented to person, place, and time.   Skin: Skin is warm and dry.   Psychiatric: He has a normal mood and affect. His behavior is normal.   Nursing note and vitals reviewed.      Procedures         ED Course  ED Course                  MDM    Final diagnoses:   Neck pain            Marcel Dobbs, APRN  07/27/17 1534

## 2017-08-01 ENCOUNTER — HOSPITAL ENCOUNTER (EMERGENCY)
Facility: HOSPITAL | Age: 47
Discharge: HOME | End: 2017-08-02
Attending: EMERGENCY MEDICINE | Admitting: EMERGENCY MEDICINE

## 2017-08-01 ENCOUNTER — TELEPHONE (OUTPATIENT)
Dept: FAMILY MEDICINE CLINIC | Facility: CLINIC | Age: 47
End: 2017-08-01

## 2017-08-01 ENCOUNTER — LAB (OUTPATIENT)
Dept: FAMILY MEDICINE CLINIC | Facility: CLINIC | Age: 47
End: 2017-08-01

## 2017-08-01 DIAGNOSIS — I10 ESSENTIAL HYPERTENSION: Primary | ICD-10-CM

## 2017-08-01 DIAGNOSIS — M79.671 BILATERAL LEG AND FOOT PAIN: ICD-10-CM

## 2017-08-01 DIAGNOSIS — M79.672 BILATERAL LEG AND FOOT PAIN: ICD-10-CM

## 2017-08-01 DIAGNOSIS — R53.82 CHRONIC FATIGUE: ICD-10-CM

## 2017-08-01 DIAGNOSIS — D64.9 ANEMIA, UNSPECIFIED TYPE: ICD-10-CM

## 2017-08-01 DIAGNOSIS — R07.89 ATYPICAL CHEST PAIN: ICD-10-CM

## 2017-08-01 DIAGNOSIS — M79.605 BILATERAL LEG AND FOOT PAIN: ICD-10-CM

## 2017-08-01 DIAGNOSIS — M79.604 BILATERAL LEG AND FOOT PAIN: ICD-10-CM

## 2017-08-01 LAB
ALBUMIN SERPL-MCNC: 5 G/DL (ref 3.5–5)
ALBUMIN/GLOB SERPL: 1.4 G/DL (ref 1.5–2.5)
ALP SERPL-CCNC: 107 U/L (ref 40–129)
ALT SERPL W P-5'-P-CCNC: 95 U/L (ref 10–44)
ANION GAP SERPL CALCULATED.3IONS-SCNC: 8.1 MMOL/L (ref 3.6–11.2)
AST SERPL-CCNC: 59 U/L (ref 10–34)
BASOPHILS # BLD AUTO: 0.06 10*3/MM3 (ref 0–0.3)
BASOPHILS NFR BLD AUTO: 0.9 % (ref 0–2)
BILIRUB SERPL-MCNC: 0.4 MG/DL (ref 0.2–1.8)
BNP SERPL-MCNC: 22 PG/ML (ref 0–100)
BUN BLD-MCNC: 14 MG/DL (ref 7–21)
BUN/CREAT SERPL: 12.8 (ref 7–25)
CALCIUM SPEC-SCNC: 10 MG/DL (ref 7.7–10)
CHLORIDE SERPL-SCNC: 101 MMOL/L (ref 99–112)
CHOLEST SERPL-MCNC: 208 MG/DL (ref 0–200)
CK MB SERPL-CCNC: 1.89 NG/ML (ref 0–5)
CK MB SERPL-RTO: 1.7 % (ref 0–3)
CK SERPL-CCNC: 111 U/L (ref 24–204)
CO2 SERPL-SCNC: 27.9 MMOL/L (ref 24.3–31.9)
CREAT BLD-MCNC: 1.09 MG/DL (ref 0.43–1.29)
D DIMER PPP FEU-MCNC: 0.44 MCGFEU/ML (ref 0–0.5)
DEPRECATED RDW RBC AUTO: 41.6 FL (ref 37–54)
EOSINOPHIL # BLD AUTO: 0.2 10*3/MM3 (ref 0–0.7)
EOSINOPHIL NFR BLD AUTO: 2.9 % (ref 0–5)
ERYTHROCYTE [DISTWIDTH] IN BLOOD BY AUTOMATED COUNT: 13.3 % (ref 11.5–14.5)
GFR SERPL CREATININE-BSD FRML MDRD: 73 ML/MIN/1.73
GLOBULIN UR ELPH-MCNC: 3.6 GM/DL
GLUCOSE BLD-MCNC: 105 MG/DL (ref 70–110)
HCT VFR BLD AUTO: 37.5 % (ref 42–52)
HDLC SERPL-MCNC: 53 MG/DL (ref 60–100)
HGB BLD-MCNC: 12.6 G/DL (ref 14–18)
IMM GRANULOCYTES # BLD: 0.08 10*3/MM3 (ref 0–0.03)
IMM GRANULOCYTES NFR BLD: 1.2 % (ref 0–0.5)
IRON 24H UR-MRATE: 105 MCG/DL (ref 53–167)
LDLC SERPL CALC-MCNC: 139 MG/DL (ref 0–100)
LDLC/HDLC SERPL: 2.62 {RATIO}
LYMPHOCYTES # BLD AUTO: 1.68 10*3/MM3 (ref 1–3)
LYMPHOCYTES NFR BLD AUTO: 24.3 % (ref 21–51)
MAGNESIUM SERPL-MCNC: 2.5 MG/DL (ref 1.7–2.6)
MCH RBC QN AUTO: 29.2 PG (ref 27–33)
MCHC RBC AUTO-ENTMCNC: 33.6 G/DL (ref 33–37)
MCV RBC AUTO: 87 FL (ref 80–94)
MONOCYTES # BLD AUTO: 0.62 10*3/MM3 (ref 0.1–0.9)
MONOCYTES NFR BLD AUTO: 9 % (ref 0–10)
MYOGLOBIN SERPL-MCNC: 47 NG/ML (ref 0–109)
NEUTROPHILS # BLD AUTO: 4.26 10*3/MM3 (ref 1.4–6.5)
NEUTROPHILS NFR BLD AUTO: 61.7 % (ref 30–70)
OSMOLALITY SERPL CALC.SUM OF ELEC: 274.7 MOSM/KG (ref 273–305)
PHOSPHATE SERPL-MCNC: 3.8 MG/DL (ref 2.7–4.5)
PLATELET # BLD AUTO: 281 10*3/MM3 (ref 130–400)
PMV BLD AUTO: 9.4 FL (ref 6–10)
POTASSIUM BLD-SCNC: 4.1 MMOL/L (ref 3.5–5.3)
PROT SERPL-MCNC: 8.6 G/DL (ref 6–8)
RBC # BLD AUTO: 4.31 10*6/MM3 (ref 4.7–6.1)
SODIUM BLD-SCNC: 137 MMOL/L (ref 135–153)
TRIGL SERPL-MCNC: 81 MG/DL (ref 0–150)
TROPONIN I SERPL-MCNC: <0.006 NG/ML
TSH SERPL DL<=0.05 MIU/L-ACNC: 3.93 MIU/ML (ref 0.55–4.78)
VIT B12 BLD-MCNC: 357 PG/ML (ref 211–911)
VLDLC SERPL-MCNC: 16.2 MG/DL
WBC NRBC COR # BLD: 6.9 10*3/MM3 (ref 4.5–12.5)

## 2017-08-01 PROCEDURE — 80061 LIPID PANEL: CPT | Performed by: INTERNAL MEDICINE

## 2017-08-01 PROCEDURE — 83874 ASSAY OF MYOGLOBIN: CPT | Performed by: EMERGENCY MEDICINE

## 2017-08-01 PROCEDURE — 93005 ELECTROCARDIOGRAM TRACING: CPT | Performed by: EMERGENCY MEDICINE

## 2017-08-01 PROCEDURE — 80053 COMPREHEN METABOLIC PANEL: CPT | Performed by: EMERGENCY MEDICINE

## 2017-08-01 PROCEDURE — 99285 EMERGENCY DEPT VISIT HI MDM: CPT

## 2017-08-01 PROCEDURE — 83036 HEMOGLOBIN GLYCOSYLATED A1C: CPT | Performed by: EMERGENCY MEDICINE

## 2017-08-01 PROCEDURE — 85379 FIBRIN DEGRADATION QUANT: CPT | Performed by: EMERGENCY MEDICINE

## 2017-08-01 PROCEDURE — 83735 ASSAY OF MAGNESIUM: CPT | Performed by: EMERGENCY MEDICINE

## 2017-08-01 PROCEDURE — 85025 COMPLETE CBC W/AUTO DIFF WBC: CPT | Performed by: EMERGENCY MEDICINE

## 2017-08-01 PROCEDURE — 83540 ASSAY OF IRON: CPT | Performed by: INTERNAL MEDICINE

## 2017-08-01 PROCEDURE — 36415 COLL VENOUS BLD VENIPUNCTURE: CPT | Performed by: FAMILY MEDICINE

## 2017-08-01 PROCEDURE — 82550 ASSAY OF CK (CPK): CPT | Performed by: EMERGENCY MEDICINE

## 2017-08-01 PROCEDURE — 83880 ASSAY OF NATRIURETIC PEPTIDE: CPT | Performed by: EMERGENCY MEDICINE

## 2017-08-01 PROCEDURE — 84100 ASSAY OF PHOSPHORUS: CPT | Performed by: EMERGENCY MEDICINE

## 2017-08-01 PROCEDURE — 93010 ELECTROCARDIOGRAM REPORT: CPT | Performed by: INTERNAL MEDICINE

## 2017-08-01 PROCEDURE — 82607 VITAMIN B-12: CPT | Performed by: INTERNAL MEDICINE

## 2017-08-01 PROCEDURE — 82553 CREATINE MB FRACTION: CPT | Performed by: EMERGENCY MEDICINE

## 2017-08-01 PROCEDURE — 84484 ASSAY OF TROPONIN QUANT: CPT | Performed by: EMERGENCY MEDICINE

## 2017-08-01 PROCEDURE — 84443 ASSAY THYROID STIM HORMONE: CPT | Performed by: EMERGENCY MEDICINE

## 2017-08-01 NOTE — TELEPHONE ENCOUNTER
PATIENT STATES HE IS HAVING PAIN IN BOTH LEGS STARTING IN FEET MOVING UP INTO HIS THIGHS WITH BURNING SENSATION. ALSO HAS SOME HAND TINGLING. HE HAS A STRESS TEST SCHEDULED IN THE NEAR FUTURE. IS IS CONCERNED WITH POSSIBLE RESTLESS LEG SYNDROME. ALSO HAS HAD SOME BP ISSUES. PLEASE ADVISE.    PATIENT #[phone]

## 2017-08-02 VITALS
HEART RATE: 74 BPM | OXYGEN SATURATION: 99 % | SYSTOLIC BLOOD PRESSURE: 174 MMHG | DIASTOLIC BLOOD PRESSURE: 106 MMHG | RESPIRATION RATE: 18 BRPM | WEIGHT: 255 LBS | HEIGHT: 70 IN | TEMPERATURE: 97 F | BODY MASS INDEX: 36.51 KG/M2

## 2017-08-02 LAB
HBA1C MFR BLD: 5.1 % (ref 4.5–5.7)
TROPONIN I SERPL-MCNC: <0.006 NG/ML

## 2017-08-02 PROCEDURE — 84484 ASSAY OF TROPONIN QUANT: CPT | Performed by: EMERGENCY MEDICINE

## 2017-08-02 RX ORDER — PREGABALIN 50 MG/1
100 CAPSULE ORAL ONCE
Status: COMPLETED | OUTPATIENT
Start: 2017-08-02 | End: 2017-08-02

## 2017-08-02 RX ORDER — CLONIDINE HYDROCHLORIDE 0.1 MG/1
0.1 TABLET ORAL ONCE
Status: COMPLETED | OUTPATIENT
Start: 2017-08-02 | End: 2017-08-02

## 2017-08-02 RX ADMIN — PREGABALIN 100 MG: 50 CAPSULE ORAL at 00:09

## 2017-08-02 RX ADMIN — CLONIDINE HYDROCHLORIDE 0.1 MG: 0.1 TABLET ORAL at 01:54

## 2017-08-02 NOTE — DISCHARGE INSTRUCTIONS

## 2017-08-02 NOTE — ED NOTES
Lab states another purple top needs to be collected due to it being clotted.     Zakia Nascimento RN  08/01/17 2712

## 2017-08-03 ENCOUNTER — OFFICE VISIT (OUTPATIENT)
Dept: FAMILY MEDICINE CLINIC | Facility: CLINIC | Age: 47
End: 2017-08-03

## 2017-08-03 VITALS
TEMPERATURE: 97.8 F | DIASTOLIC BLOOD PRESSURE: 104 MMHG | BODY MASS INDEX: 36.05 KG/M2 | HEART RATE: 92 BPM | OXYGEN SATURATION: 95 % | HEIGHT: 70 IN | SYSTOLIC BLOOD PRESSURE: 155 MMHG | WEIGHT: 251.8 LBS

## 2017-08-03 DIAGNOSIS — R07.9 CHEST PAIN IN ADULT: Primary | ICD-10-CM

## 2017-08-03 DIAGNOSIS — J98.4 ABNORMALITY OF LUNG: ICD-10-CM

## 2017-08-03 DIAGNOSIS — I10 ESSENTIAL HYPERTENSION: ICD-10-CM

## 2017-08-03 DIAGNOSIS — K80.20 GALLSTONES: ICD-10-CM

## 2017-08-03 PROCEDURE — 99214 OFFICE O/P EST MOD 30 MIN: CPT | Performed by: NURSE PRACTITIONER

## 2017-08-03 RX ORDER — AMLODIPINE BESYLATE 10 MG/1
10 TABLET ORAL DAILY
Qty: 30 TABLET | Refills: 5 | Status: SHIPPED | OUTPATIENT
Start: 2017-08-03

## 2017-08-03 NOTE — ED PROVIDER NOTES
Subjective   Patient is a 47 y.o. male presenting with lower extremity pain.   Lower Extremity Issue   Location:  Leg  Injury: no    Leg location:  L leg and R leg  Pain details:     Quality:  Aching    Radiates to:  Does not radiate    Severity:  Mild    Onset quality:  Gradual    Timing:  Constant    Progression:  Worsening  Chronicity:  New  Dislocation: no    Foreign body present:  No foreign bodies  Tetanus status:  Up to date  Prior injury to area:  No  Relieved by:  Nothing  Worsened by:  Nothing  Ineffective treatments:  Rest and elevation  Associated symptoms: no back pain, no decreased ROM, no fatigue, no fever, no itching, no muscle weakness, no neck pain, no numbness, no stiffness, no swelling and no tingling    Risk factors: obesity        Review of Systems   Constitutional: Negative for activity change, appetite change, chills, diaphoresis, fatigue and fever.   HENT: Negative for congestion, ear pain and sore throat.    Eyes: Negative for redness.   Respiratory: Negative for cough, chest tightness, shortness of breath and wheezing.    Cardiovascular: Positive for chest pain. Negative for palpitations and leg swelling.   Gastrointestinal: Negative for abdominal pain, diarrhea, nausea and vomiting.   Genitourinary: Negative for dysuria and urgency.   Musculoskeletal: Positive for myalgias. Negative for arthralgias, back pain, neck pain and stiffness.   Skin: Negative for itching, pallor, rash and wound.   Neurological: Negative for dizziness, speech difficulty, weakness and headaches.   Psychiatric/Behavioral: Negative for agitation, behavioral problems, confusion and decreased concentration.       Past Medical History:   Diagnosis Date   • Arthritis     HAND AND KNEE   • Hypertension    • Poison ivy        Allergies   Allergen Reactions   • Penicillins      UNKNOWN       Past Surgical History:   Procedure Laterality Date   • ACHILLES TENDON REPAIR Left 04/14/2017   • HERNIA REPAIR  2001    Approx.        Family History   Problem Relation Age of Onset   • Heart disease Mother      CARDIAC DISORDER   • Hypertension Mother    • Hyperlipidemia Mother    • Heart disease Father      VALVULAR   • Diabetes Maternal Grandmother        Social History     Social History   • Marital status:      Spouse name: N/A   • Number of children: N/A   • Years of education: N/A     Social History Main Topics   • Smoking status: Never Smoker   • Smokeless tobacco: Never Used   • Alcohol use No   • Drug use: No   • Sexual activity: Defer     Other Topics Concern   • None     Social History Narrative   • None           Objective   Physical Exam   Constitutional: He is oriented to person, place, and time. He appears well-developed and well-nourished.  Non-toxic appearance. No distress.   HENT:   Head: Normocephalic and atraumatic.   Right Ear: External ear normal.   Left Ear: External ear normal.   Nose: Nose normal.   Mouth/Throat: Oropharynx is clear and moist and mucous membranes are normal. No oropharyngeal exudate. No tonsillar exudate.   Eyes: Conjunctivae, EOM and lids are normal. Pupils are equal, round, and reactive to light.   Neck: Normal range of motion and full passive range of motion without pain. Neck supple. No thyromegaly present.   Cardiovascular: Normal rate, regular rhythm, S1 normal, S2 normal, normal heart sounds, intact distal pulses and normal pulses.    Pulmonary/Chest: Effort normal and breath sounds normal. No tachypnea. No respiratory distress. He has no decreased breath sounds. He has no wheezes. He has no rales. He exhibits no tenderness.   Abdominal: Soft. Normal appearance and bowel sounds are normal. He exhibits no distension. There is no tenderness. There is no rebound and no guarding.   Musculoskeletal: Normal range of motion. He exhibits no edema, tenderness or deformity.   Lymphadenopathy:     He has no cervical adenopathy.   Neurological: He is alert and oriented to person, place, and time.  He has normal strength. No cranial nerve deficit or sensory deficit. GCS eye subscore is 4. GCS verbal subscore is 5. GCS motor subscore is 6.   Skin: Skin is warm, dry and intact. No rash noted. He is not diaphoretic. No erythema. No pallor.   Psychiatric: He has a normal mood and affect. His speech is normal and behavior is normal. Judgment and thought content normal. Cognition and memory are normal.   Nursing note and vitals reviewed.      Procedures         ED Course  ED Course   Value Comment By Time   ECG 12 Lead Normal Sinus Rhythm.  No Acute Ischemia. Lewis Boggs MD 08/03 0712                  MDM  Number of Diagnoses or Management Options  Atypical chest pain: new and requires workup  Bilateral leg and foot pain: established and worsening  Essential hypertension: established and worsening     Amount and/or Complexity of Data Reviewed  Clinical lab tests: ordered and reviewed  Tests in the radiology section of CPT®: ordered and reviewed  Tests in the medicine section of CPT®: ordered and reviewed  Decide to obtain previous medical records or to obtain history from someone other than the patient: yes  Independent visualization of images, tracings, or specimens: yes    Risk of Complications, Morbidity, and/or Mortality  Presenting problems: moderate  Diagnostic procedures: moderate  Management options: moderate    Patient Progress  Patient progress: stable      Final diagnoses:   Essential hypertension   Atypical chest pain   Bilateral leg and foot pain            Lewis Boggs MD  08/03/17 7133

## 2017-08-03 NOTE — PROGRESS NOTES
"Subjective   Romaine Pina is a 47 y.o. male.   Chief Complaint   Patient presents with   • Hypertension     History of Present Illness     The patient presents today with complaints of elevated blood pressure.  This has gradually worsened over the past week.  Has also had persistent chest pain, fatigue, and diaphoresis. Has also had paresthesias in bilateral feet and hands.  He was evaluated by cardiology locally and a stress test had been scheduled.  He was evaluated in the ER at [facility] 2 days ago.  He then chose to go to [facility] ER yesterday for reevaluation.  States he was given morphine, nitroglycerin, and a blood pressure medication he is not sure of the name.  He was feeling much better after discharge.  These records are not available to me.  Also reports there was a finding in his lungs on the CT scan.  Have recommended referral to pulmonology.  He was again referred for stress testing by different provider in [place].  This is scheduled for August 8, 2017.  Incidentally, during these workups has been noted to have probable gallstones.  His AST and ALT were elevated on CMP from 8/1/17. He has also been under treatment by orthopedics for an achilles tendon rupture. He reports this was repaired, but has now had multiple infections and is currently taking an antibiotic.    The following portions of the patient's history were reviewed and updated as appropriate: allergies, current medications, past family history, past medical history, past social history, past surgical history and problem list.  BP (!) 155/104 (BP Location: Left arm, Patient Position: Sitting)  Pulse 92  Temp 97.8 °F (36.6 °C) (Oral)   Ht 70\" (177.8 cm)  Wt 251 lb 12.8 oz (114 kg)  SpO2 95%  BMI 36.13 kg/m2  Review of Systems   Constitutional: Positive for diaphoresis and fatigue. Negative for chills and fever.   HENT: Negative for congestion, ear pain, rhinorrhea and sore throat.    Respiratory: Negative " for cough, shortness of breath and wheezing.    Cardiovascular: Positive for chest pain. Negative for palpitations and leg swelling.   Gastrointestinal: Negative for abdominal pain, diarrhea, nausea and vomiting.   Genitourinary: Negative for dysuria and urgency.   Musculoskeletal: Negative for arthralgias and myalgias.   Skin: Negative for rash and wound.   Neurological: Positive for numbness. Negative for dizziness and light-headedness.   Psychiatric/Behavioral: Positive for sleep disturbance. The patient is nervous/anxious.        Objective   Physical Exam   Constitutional: He is oriented to person, place, and time. He appears well-developed and well-nourished.   HENT:   Head: Normocephalic and atraumatic.   Cardiovascular: Normal rate, regular rhythm and normal heart sounds.    Pulmonary/Chest: Effort normal and breath sounds normal.   Abdominal: Soft. Bowel sounds are normal. There is tenderness (RUQ).   Musculoskeletal:   Gait and station normal   Neurological: He is alert and oriented to person, place, and time.   Skin: Skin is warm and dry.   Psychiatric: He has a normal mood and affect. His behavior is normal.       Assessment/Plan   Romaine was seen today for hypertension.    Diagnoses and all orders for this visit:    Chest pain in adult    Essential hypertension  -     amLODIPine (NORVASC) 10 MG tablet; Take 1 tablet by mouth Daily.    Gallstones    Abnormality of lung  -     Ambulatory Referral to Pulmonology      For the elevated blood pressure, I have increased the amlodipine to 10 mg daily.  He will need to follow-up for stress testing as scheduled.  Follow-up with cardiology.  We'll also facilitate referral to pulmonology.  Utilize the emergency room if needed.  He may also ultimately need referral to surgeon due to the all stones.  He voices understanding.  Follow-up here as scheduled needed.

## 2017-08-09 ENCOUNTER — OFFICE VISIT (OUTPATIENT)
Dept: PULMONOLOGY | Facility: CLINIC | Age: 47
End: 2017-08-09

## 2017-08-09 VITALS
WEIGHT: 254 LBS | TEMPERATURE: 97.9 F | DIASTOLIC BLOOD PRESSURE: 85 MMHG | HEART RATE: 84 BPM | BODY MASS INDEX: 35.56 KG/M2 | HEIGHT: 71 IN | OXYGEN SATURATION: 96 % | SYSTOLIC BLOOD PRESSURE: 135 MMHG

## 2017-08-09 DIAGNOSIS — E66.9 OBESITY (BMI 35.0-39.9 WITHOUT COMORBIDITY): ICD-10-CM

## 2017-08-09 DIAGNOSIS — G47.33 OBSTRUCTIVE SLEEP APNEA SYNDROME: ICD-10-CM

## 2017-08-09 DIAGNOSIS — R93.89 ABNORMAL CT SCAN: ICD-10-CM

## 2017-08-09 DIAGNOSIS — R53.82 CHRONIC FATIGUE: ICD-10-CM

## 2017-08-09 DIAGNOSIS — R06.02 SHORTNESS OF BREATH: Primary | ICD-10-CM

## 2017-08-09 PROCEDURE — 99204 OFFICE O/P NEW MOD 45 MIN: CPT | Performed by: INTERNAL MEDICINE

## 2017-08-09 NOTE — PROGRESS NOTES
Were you born premature?  no    Any Childhood infections? no      Breathing problems when you were a child? no    Any childhood allergies?    no             At what age did you begin smoking? no    Smoking marijuana? yes    Any IV drugs? no    How many packs per day? 0    Lung Function Test? no  Chest X-Ray? yes    CT Chest? yes Allergy Test? no    Family hx of Lung disease or Lung Cancer?yes    If FHx is posivitive for lung cancer, what is the relationship of the family member? Two aunt and one uncle    Any hospitalization in the last year? yes    How far can you walk without getting short of breath? 20 feet    Any coughing? no    Any wheezing? no    Acid Reflux? no    Do you snore? yes    Daytime Fatigue? yes    Any pets? no   Any pet allergies? no    Occupation?     Have you been exposed to any chemicals at your job? no    What inhalers are you currently using? none      Subjective    Romaine Pina presents for the following sob.He was referred by his primary care Dr.David Wiseman for SOB and lung pain.     History of Present Illness     Mr. Pina is a 47 year old male that is here today for an evaluation of shortness of breath and abnormal CT scan.  He was not born premature, and did not have any childhood allergies, illnesses or respiratory problems.  He states that he is a nonsmoker but that he did use to smoke marijuana.  He denies any alcohol use. He reports that 2 aunts and an uncle were diagnosed with lung cancer.  He has no complaints of coughing, allergies, or acid reflux.  He says that he typically does not experience daytime fatigue but that his wife has told him that he snores and that he stop breathing when he is sleeping.  He works as an .  He states that he was diagnosed with pneumonia after injuring his achilles tendon. At this time a CT was done and showed scarring vs. atelectasis in the lingula and right middle lobe.  This was discussed the patient in detail.  Will  repeat CT scan in 6 weeks and obtain a sleep study.      Review of Systems   Constitutional: Positive for activity change. Negative for chills, diaphoresis, fatigue and unexpected weight change.   HENT: Negative for congestion and trouble swallowing.    Respiratory: Positive for apnea and shortness of breath. Negative for cough and wheezing.    Cardiovascular: Negative for chest pain and palpitations.   Gastrointestinal: Negative for abdominal distention, abdominal pain, diarrhea, nausea and vomiting.   Endocrine: Negative for polydipsia, polyphagia and polyuria.   Allergic/Immunologic: Negative for environmental allergies, food allergies and immunocompromised state.   Neurological: Negative for dizziness, syncope, light-headedness and headaches.   Psychiatric/Behavioral: Negative for agitation and confusion. The patient is not nervous/anxious.        Active Problems:  Problem List Items Addressed This Visit     Chronic fatigue    Obesity (BMI 35.0-39.9 without comorbidity)    Obstructive sleep apnea syndrome    Relevant Orders    Ambulatory Referral to Sleep Lab    Shortness of breath - Primary      Other Visit Diagnoses     Abnormal CT scan        Relevant Orders    CT Chest Without Contrast          Past Medical History:  Past Medical History:   Diagnosis Date   • Anemia    • Arthritis     HAND AND KNEE   • Hypertension    • Obstructive sleep apnea syndrome 8/9/2017   • Pneumonia    • Poison ivy    • Rheumatoid arthritis        Family History:  Family History   Problem Relation Age of Onset   • Heart disease Mother      CARDIAC DISORDER   • Hypertension Mother    • Hyperlipidemia Mother    • Heart disease Father      VALVULAR   • Diabetes Maternal Grandmother        Social History:  Social History   Substance Use Topics   • Smoking status: Never Smoker   • Smokeless tobacco: Never Used   • Alcohol use No       Current Medications:  Current Outpatient Prescriptions   Medication Sig Dispense Refill   •  "acetaminophen-codeine (TYLENOL #3) 300-30 MG per tablet Take 1 tablet by mouth Every 4 (Four) Hours As Needed for Moderate Pain (4-6). 15 tablet 0   • amLODIPine (NORVASC) 10 MG tablet Take 1 tablet by mouth Daily. 30 tablet 5   • aspirin 81 MG tablet Take 1 tablet by mouth Daily.     • hydrochlorothiazide (HYDRODIURIL) 25 MG tablet Take 1 tablet by mouth Daily. 90 tablet 1   • ibuprofen (ADVIL,MOTRIN) 800 MG tablet Take 1 tablet by mouth Every 8 (Eight) Hours As Needed for Mild Pain (1-3). 90 tablet 1   • losartan (COZAAR) 50 MG tablet Take 1 tablet by mouth Daily. (Patient taking differently: Take 100 mg by mouth Daily.) 90 tablet 1   • sulfamethoxazole-trimethoprim (BACTRIM DS,SEPTRA DS) 800-160 MG per tablet Take 1 tablet by mouth 2 (Two) Times a Day.       No current facility-administered medications for this visit.        Allergies:  Allergies   Allergen Reactions   • Penicillins      UNKNOWN       Vitals:  /85 (BP Location: Left arm, Patient Position: Sitting, Cuff Size: Adult)  Pulse 84  Temp 97.9 °F (36.6 °C) (Oral)   Ht 71\" (180.3 cm)  Wt 254 lb (115 kg)  SpO2 96%  BMI 35.43 kg/m2    Imaging:    Imaging Results (most recent)     None          Pulmonary Functions Testing Results:    No results found for: FEV1, FVC, WBI8FZB, TLC, DLCO    Results for orders placed or performed during the hospital encounter of 08/01/17   Comprehensive Metabolic Panel   Result Value Ref Range    Glucose 105 70 - 110 mg/dL    BUN 14 7 - 21 mg/dL    Creatinine 1.09 0.43 - 1.29 mg/dL    Sodium 137 135 - 153 mmol/L    Potassium 4.1 3.5 - 5.3 mmol/L    Chloride 101 99 - 112 mmol/L    CO2 27.9 24.3 - 31.9 mmol/L    Calcium 10.0 7.7 - 10.0 mg/dL    Total Protein 8.6 (H) 6.0 - 8.0 g/dL    Albumin 5.00 3.50 - 5.00 g/dL    ALT (SGPT) 95 (H) 10 - 44 U/L    AST (SGOT) 59 (H) 10 - 34 U/L    Alkaline Phosphatase 107 40 - 129 U/L    Total Bilirubin 0.4 0.2 - 1.8 mg/dL    eGFR Non African Amer 73 >60 mL/min/1.73    Globulin 3.6 " gm/dL    A/G Ratio 1.4 (L) 1.5 - 2.5 g/dL    BUN/Creatinine Ratio 12.8 7.0 - 25.0    Anion Gap 8.1 3.6 - 11.2 mmol/L   CK   Result Value Ref Range    Creatine Kinase 111 24 - 204 U/L   Myoglobin, Serum   Result Value Ref Range    Myoglobin 47.0 0.0 - 109.0 ng/mL   CK-MB   Result Value Ref Range    CKMB 1.89 0.00 - 5.00 ng/mL   Troponin   Result Value Ref Range    Troponin I <0.006 <=0.040 ng/mL   D-dimer, Quantitative   Result Value Ref Range    D-Dimer, Quantitative 0.44 0.00 - 0.50 MCGFEU/mL   BNP   Result Value Ref Range    BNP 22.0 0.0 - 100.0 pg/mL   TSH   Result Value Ref Range    TSH 3.926 0.550 - 4.780 mIU/mL   Magnesium   Result Value Ref Range    Magnesium 2.5 1.7 - 2.6 mg/dL   Phosphorus   Result Value Ref Range    Phosphorus 3.8 2.7 - 4.5 mg/dL   CBC Auto Differential   Result Value Ref Range    WBC 6.90 4.50 - 12.50 10*3/mm3    RBC 4.31 (L) 4.70 - 6.10 10*6/mm3    Hemoglobin 12.6 (L) 14.0 - 18.0 g/dL    Hematocrit 37.5 (L) 42.0 - 52.0 %    MCV 87.0 80.0 - 94.0 fL    MCH 29.2 27.0 - 33.0 pg    MCHC 33.6 33.0 - 37.0 g/dL    RDW 13.3 11.5 - 14.5 %    RDW-SD 41.6 37.0 - 54.0 fl    MPV 9.4 6.0 - 10.0 fL    Platelets 281 130 - 400 10*3/mm3    Neutrophil % 61.7 30.0 - 70.0 %    Lymphocyte % 24.3 21.0 - 51.0 %    Monocyte % 9.0 0.0 - 10.0 %    Eosinophil % 2.9 0.0 - 5.0 %    Basophil % 0.9 0.0 - 2.0 %    Immature Grans % 1.2 (H) 0.0 - 0.5 %    Neutrophils, Absolute 4.26 1.40 - 6.50 10*3/mm3    Lymphocytes, Absolute 1.68 1.00 - 3.00 10*3/mm3    Monocytes, Absolute 0.62 0.10 - 0.90 10*3/mm3    Eosinophils, Absolute 0.20 0.00 - 0.70 10*3/mm3    Basophils, Absolute 0.06 0.00 - 0.30 10*3/mm3    Immature Grans, Absolute 0.08 (H) 0.00 - 0.03 10*3/mm3   Osmolality, Calculated   Result Value Ref Range    Osmolality Calc 274.7 273.0 - 305.0 mOsm/kg   CK-MB Index   Result Value Ref Range    CK-MB Index 1.7 0.0 - 3.0 %   Troponin   Result Value Ref Range    Troponin I <0.006 <=0.040 ng/mL   Hemoglobin A1c   Result Value  Ref Range    Hemoglobin A1C 5.10 4.50 - 5.70 %       Objective   Physical Exam    GENERAL APPEARANCE: Well developed, well nourished, alert and cooperative, and appears to be in no acute distress.    HEAD: normocephalic.    EYES: PERRL, EOMI. Fundi normal, vision is grossly intact.    THROAT: Oral cavity and pharynx normal. No inflammation, swelling, exudate, or lesions.     NECK: Neck supple.     CARDIAC: Normal S1 and S2. No S3, S4 or murmurs. Rhythm is regular. There is no peripheral edema, cyanosis or pallor. Extremities are warm and well perfused. Capillary refill is less than 2 seconds. No carotid bruits.    RESPIRATORY: Clear to auscultation without rales, rhonchi, wheezing or diminished breath sounds.    GI: Positive bowel sounds. Soft, nondistended, nontender.     MUSCULOSKELETAL: No significant deformity or joint abnormality. No edema. Peripheral pulses intact. No varicosities.    NEUROLOGICAL: Strength and sensation symmetric and intact throughout.     PSYCHIATRIC: The mental examination revealed the patient was oriented to person, place, and time.     Assessment/Plan      Shortness of breath: likely due to recent pneumonia and body habitus.    Grade I diastolic dysfunction shown on ECHO.  Instructed patient that weight loss would be beneficial for his shortness of breath as well as cardiac issues.    Abnormal CT scan: Recently diagnosed with pneumonia-treated with antibiotics.  CT revealed scarring or atelectasis in the lingula and right middle lobe.  Will repeat CT in 6 weeks.  CT report was reviewed with the patient in great detail.  I answered his questions to satisfaction.    Obeisty:  Diet and exercise counseling done for over 10 minutes.    Sleep apnea: Patient has no complaints of fatigue and interrupted sleeping patterns.  He does states that his wife tells him that he stops breathing when he is sleeping.  Will order sleep study to rule out sleep apnea.    Educated patient on the complications  associated with untreated sleep apnea -- that it increases risk of MI, stroke, depression, hypertension, heart failure, arrhythmias, coronary artery disease, and potential death due to complication of that mentioned previously.    Also patient was educated  about the hazards of driving if there sleep deprived and has sleep apnea.  Was instructed not to involving driving or any activity which involves higher level of mental function- like operating a machine.    Scribed for Dr. Fried, by PRISCILA Hoyt        ICD-10-CM ICD-9-CM   1. Shortness of breath R06.02 786.05   2. Obstructive sleep apnea syndrome G47.33 327.23   3. Obesity (BMI 35.0-39.9 without comorbidity) E66.9 278.00   4. Chronic fatigue R53.82 780.79   5. Abnormal CT scan R93.8 793.99       Return in about 3 months (around 11/9/2017).         Pardeep JAMES M.D. attest that the above note accurately reflects the work and decisions made  by me.  Patient was seen and evaluated by Dr. Fried, including history of present illness, physical exam, assessment, and treatment plan.  The above note was reviewed and edited by Dr. Fried.

## 2018-01-02 DIAGNOSIS — I10 ESSENTIAL HYPERTENSION: ICD-10-CM

## 2018-01-03 RX ORDER — HYDROCHLOROTHIAZIDE 25 MG/1
TABLET ORAL
Qty: 90 TABLET | Refills: 1 | OUTPATIENT
Start: 2018-01-03

## 2018-02-22 DIAGNOSIS — I10 ESSENTIAL HYPERTENSION: ICD-10-CM

## 2018-02-22 RX ORDER — LOSARTAN POTASSIUM 50 MG/1
TABLET ORAL
Qty: 90 TABLET | Refills: 1 | OUTPATIENT
Start: 2018-02-22

## 2018-02-22 RX ORDER — HYDROCHLOROTHIAZIDE 25 MG/1
TABLET ORAL
Qty: 90 TABLET | Refills: 1 | OUTPATIENT
Start: 2018-02-22

## 2018-07-10 ENCOUNTER — HOSPITAL ENCOUNTER (OUTPATIENT)
Dept: GENERAL RADIOLOGY | Facility: HOSPITAL | Age: 48
Discharge: HOME | End: 2018-07-10
Admitting: FAMILY MEDICINE

## 2018-07-10 ENCOUNTER — TRANSCRIBE ORDERS (OUTPATIENT)
Dept: ADMINISTRATIVE | Facility: HOSPITAL | Age: 48
End: 2018-07-10

## 2018-07-10 ENCOUNTER — HOSPITAL ENCOUNTER (OUTPATIENT)
Dept: GENERAL RADIOLOGY | Facility: HOSPITAL | Age: 48
Discharge: HOME | End: 2018-07-10

## 2018-07-10 DIAGNOSIS — M79.641 PAIN IN RIGHT HAND: Primary | ICD-10-CM

## 2018-07-10 PROCEDURE — 73130 X-RAY EXAM OF HAND: CPT | Performed by: RADIOLOGY

## 2018-07-10 PROCEDURE — 73130 X-RAY EXAM OF HAND: CPT

## 2018-07-10 PROCEDURE — 73110 X-RAY EXAM OF WRIST: CPT

## 2018-07-10 PROCEDURE — 73110 X-RAY EXAM OF WRIST: CPT | Performed by: RADIOLOGY

## 2021-07-25 ENCOUNTER — APPOINTMENT (OUTPATIENT)
Dept: CT IMAGING | Facility: HOSPITAL | Age: 51
End: 2021-07-25

## 2021-07-25 ENCOUNTER — APPOINTMENT (OUTPATIENT)
Dept: GENERAL RADIOLOGY | Facility: HOSPITAL | Age: 51
End: 2021-07-25

## 2021-07-25 ENCOUNTER — HOSPITAL ENCOUNTER (EMERGENCY)
Facility: HOSPITAL | Age: 51
Discharge: HOME | End: 2021-07-25
Attending: EMERGENCY MEDICINE | Admitting: EMERGENCY MEDICINE

## 2021-07-25 VITALS
HEIGHT: 71 IN | HEART RATE: 111 BPM | WEIGHT: 260 LBS | SYSTOLIC BLOOD PRESSURE: 131 MMHG | BODY MASS INDEX: 36.4 KG/M2 | OXYGEN SATURATION: 98 % | DIASTOLIC BLOOD PRESSURE: 86 MMHG | RESPIRATION RATE: 20 BRPM | TEMPERATURE: 98.3 F

## 2021-07-25 DIAGNOSIS — M54.12 CERVICAL RADICULOPATHY: Primary | ICD-10-CM

## 2021-07-25 PROCEDURE — 73030 X-RAY EXAM OF SHOULDER: CPT

## 2021-07-25 PROCEDURE — 25010000002 DEXAMETHASONE PER 1 MG: Performed by: PHYSICIAN ASSISTANT

## 2021-07-25 PROCEDURE — 72125 CT NECK SPINE W/O DYE: CPT

## 2021-07-25 PROCEDURE — 25010000002 KETOROLAC TROMETHAMINE PER 15 MG: Performed by: PHYSICIAN ASSISTANT

## 2021-07-25 PROCEDURE — 96372 THER/PROPH/DIAG INJ SC/IM: CPT

## 2021-07-25 PROCEDURE — 73030 X-RAY EXAM OF SHOULDER: CPT | Performed by: RADIOLOGY

## 2021-07-25 PROCEDURE — 99282 EMERGENCY DEPT VISIT SF MDM: CPT

## 2021-07-25 RX ORDER — HYDROCODONE BITARTRATE AND ACETAMINOPHEN 5; 325 MG/1; MG/1
1 TABLET ORAL 4 TIMES DAILY PRN
Qty: 12 TABLET | Refills: 0 | Status: SHIPPED | OUTPATIENT
Start: 2021-07-25

## 2021-07-25 RX ORDER — METHYLPREDNISOLONE 4 MG/1
TABLET ORAL
Qty: 21 EACH | Refills: 0 | Status: SHIPPED | OUTPATIENT
Start: 2021-07-25

## 2021-07-25 RX ORDER — KETOROLAC TROMETHAMINE 30 MG/ML
60 INJECTION, SOLUTION INTRAMUSCULAR; INTRAVENOUS ONCE
Status: COMPLETED | OUTPATIENT
Start: 2021-07-25 | End: 2021-07-25

## 2021-07-25 RX ORDER — NAPROXEN 500 MG/1
500 TABLET ORAL 2 TIMES DAILY PRN
Qty: 12 TABLET | Refills: 0 | Status: SHIPPED | OUTPATIENT
Start: 2021-07-25

## 2021-07-25 RX ORDER — DEXAMETHASONE SODIUM PHOSPHATE 4 MG/ML
8 INJECTION, SOLUTION INTRA-ARTICULAR; INTRALESIONAL; INTRAMUSCULAR; INTRAVENOUS; SOFT TISSUE ONCE
Status: COMPLETED | OUTPATIENT
Start: 2021-07-25 | End: 2021-07-25

## 2021-07-25 RX ADMIN — DEXAMETHASONE SODIUM PHOSPHATE 8 MG: 4 INJECTION, SOLUTION INTRA-ARTICULAR; INTRALESIONAL; INTRAMUSCULAR; INTRAVENOUS; SOFT TISSUE at 15:55

## 2021-07-25 RX ADMIN — KETOROLAC TROMETHAMINE 60 MG: 60 INJECTION, SOLUTION INTRAMUSCULAR at 15:55

## 2021-07-25 NOTE — ED PROVIDER NOTES
Subjective   Pt is a  and is having chronic right neck pain that is radiating down right upper extremity   Pt has pain and tingling down right upper extremity.   Has been going on for several weeks but is getting worse.   Pt denies any new injury   Has been to PT in past but did not help.   Has been on vacation /resting but no improvement       History provided by:  Patient   used: No    Neck Pain  Pain location:  R side  Quality:  Aching  Pain radiates to:  R shoulder  Pain severity:  Moderate  Pain is:  Same all the time  Onset quality:  Sudden  Timing:  Constant  Relieved by:  Nothing  Worsened by:  Nothing  Ineffective treatments:  Analgesics and NSAIDs  Associated symptoms: no bladder incontinence and no bowel incontinence        Review of Systems   Constitutional: Negative.    HENT: Negative.    Eyes: Negative.    Respiratory: Negative.    Cardiovascular: Negative.    Gastrointestinal: Negative.  Negative for bowel incontinence.   Endocrine: Negative.    Genitourinary: Negative.  Negative for bladder incontinence.   Musculoskeletal: Positive for arthralgias, joint swelling and neck pain.   Skin: Negative.    Allergic/Immunologic: Negative.    Neurological: Negative.    Hematological: Negative.    Psychiatric/Behavioral: Negative.        Past Medical History:   Diagnosis Date   • Anemia    • Arthritis     HAND AND KNEE   • Hypertension    • Obstructive sleep apnea syndrome 8/9/2017   • Pneumonia    • Poison ivy    • Rheumatoid arthritis (CMS/HCC)        Allergies   Allergen Reactions   • Penicillins      UNKNOWN       Past Surgical History:   Procedure Laterality Date   • ACHILLES TENDON REPAIR Left 04/14/2017   • HERNIA REPAIR  2001    Approx.       Family History   Problem Relation Age of Onset   • Heart disease Mother         CARDIAC DISORDER   • Hypertension Mother    • Hyperlipidemia Mother    • Heart disease Father         VALVULAR   • Diabetes Maternal Grandmother         Social History     Socioeconomic History   • Marital status:      Spouse name: Not on file   • Number of children: Not on file   • Years of education: Not on file   • Highest education level: Not on file   Tobacco Use   • Smoking status: Never Smoker   • Smokeless tobacco: Never Used   Substance and Sexual Activity   • Alcohol use: No   • Drug use: No   • Sexual activity: Defer           Objective   Physical Exam  Vitals and nursing note reviewed.   Constitutional:       Appearance: He is well-developed.   HENT:      Head: Normocephalic.   Cardiovascular:      Rate and Rhythm: Normal rate and regular rhythm.   Pulmonary:      Effort: Pulmonary effort is normal.      Breath sounds: Normal breath sounds.   Abdominal:      General: Bowel sounds are normal.      Palpations: Abdomen is soft.      Tenderness: There is no abdominal tenderness.   Musculoskeletal:      Right shoulder: No tenderness or bony tenderness. Normal range of motion.      Cervical back: Neck supple. Pain with movement and muscular tenderness present. Decreased range of motion.   Skin:     General: Skin is warm and dry.   Neurological:      Mental Status: He is alert and oriented to person, place, and time.   Psychiatric:         Behavior: Behavior normal.         Thought Content: Thought content normal.         Judgment: Judgment normal.         Procedures           ED Course  ED Course as of Jul 26 1646   Sun Jul 25, 2021 1824 Discussed with Dr Marks will refer pt to neurosurgery     [LC]      ED Course User Index  [LC] Nita Bonilla PA                                           [facility]    Final diagnoses:   Cervical radiculopathy       ED Disposition  ED Disposition     ED Disposition Condition Comment    Discharge Stable           Reji Becerra MD  [facility] [address]  [facility] [address]  [phone]    In 1 day      Dyllan Landeros MD  [address]  [facility] [id number] [phone]    In 1  day      Ike Valenzuela MD  [address]  [facility] [address]  [phone]    In 1 day           Medication List      New Prescriptions    HYDROcodone-acetaminophen 5-325 MG per tablet  Commonly known as: NORCO  Take 1 tablet by mouth 4 (Four) Times a Day As Needed for Moderate Pain .     methylPREDNISolone 4 MG dose pack  Commonly known as: MEDROL  Take as directed on package instructions.     naproxen 500 MG EC tablet  Commonly known as: EC NAPROSYN  Take 1 tablet by mouth 2 (Two) Times a Day As Needed for Mild Pain .        Changed    losartan 50 MG tablet  Commonly known as: COZAAR  Take 1 tablet by mouth Daily.  What changed: how much to take        Stop    acetaminophen-codeine 300-30 MG per tablet  Commonly known as: TYLENOL #3     ibuprofen 800 MG tablet  Commonly known as: ADVIL,MOTRIN     sulfamethoxazole-trimethoprim 800-160 MG per tablet  Commonly known as: BACTRIM DS,SEPTRA DS           Where to Get Your Medications      These medications were sent to [facility] [address] [place] [address] - [phone]  - [phone] [address] [address]    Phone: [phone]   · HYDROcodone-acetaminophen 5-325 MG per tablet  · methylPREDNISolone 4 MG dose pack  · naproxen 500 MG EC tablet          Nita Bonilla PA  07/26/21 3846

## 2021-09-10 ENCOUNTER — OFFICE VISIT (OUTPATIENT)
Dept: NEUROSURGERY | Facility: CLINIC | Age: 51
End: 2021-09-10

## 2021-09-10 VITALS
DIASTOLIC BLOOD PRESSURE: 90 MMHG | WEIGHT: 259 LBS | SYSTOLIC BLOOD PRESSURE: 160 MMHG | HEIGHT: 71 IN | TEMPERATURE: 97.6 F | BODY MASS INDEX: 36.26 KG/M2

## 2021-09-10 DIAGNOSIS — M47.812 CERVICAL SPONDYLOSIS WITHOUT MYELOPATHY: ICD-10-CM

## 2021-09-10 DIAGNOSIS — M54.12 CERVICAL RADICULOPATHY: Primary | ICD-10-CM

## 2021-09-10 DIAGNOSIS — M50.30 DEGENERATION OF CERVICAL INTERVERTEBRAL DISC: ICD-10-CM

## 2021-09-10 PROCEDURE — 99204 OFFICE O/P NEW MOD 45 MIN: CPT | Performed by: PHYSICIAN ASSISTANT

## 2021-09-10 NOTE — PROGRESS NOTES
Patient: Romaine Pina  : 1970  Chart #: [id number]    Date of Service: 09/10/2021    CHIEF COMPLAINT: Neck and right arm pain     History of Present Illness Mr Pina is a 51-year-old  who is new to our clinic.  Couple years ago he developed some neck and right arm symptoms.  Pain went away with time and anti-inflammatories.  More recently, in July of this year he woke up with recurrent neck pain radiating into the right scapula and down the arm to the index finger.  Symptoms were described as severe and unremitting.  He was treated with Medrol Dosepak and referred to formal physical therapy.  Initially he had tried some traction at the chiropractor which aggravated symptoms.  He has completed several sessions of physical therapy and overall symptoms have improved.  He continues with some mild discomfort in the arm and occasional sensory alteration.  Symptoms are worse when throwing a ball or hammering.  He has been off work but would like to return as soon as possible.  He denies left-sided symptoms.  No erin weakness      Past Medical History:   Diagnosis Date   • Anemia    • Arthritis     HAND AND KNEE   • Hypertension    • Obstructive sleep apnea syndrome 2017   • Pneumonia    • Poison ivy    • Rheumatoid arthritis (CMS/[facility], [facility])          Current Outpatient Medications:   •  amLODIPine (NORVASC) 10 MG tablet, Take 1 tablet by mouth Daily., Disp: 30 tablet, Rfl: 5  •  losartan (COZAAR) 50 MG tablet, Take 1 tablet by mouth Daily. (Patient taking differently: Take 100 mg by mouth Daily.), Disp: 90 tablet, Rfl: 1  •  aspirin 81 MG tablet, Take 1 tablet by mouth Daily., Disp: , Rfl:   •  hydrochlorothiazide (HYDRODIURIL) 25 MG tablet, Take 1 tablet by mouth Daily., Disp: 90 tablet, Rfl: 1  •  HYDROcodone-acetaminophen (NORCO) 5-325 MG per tablet, Take 1 tablet by mouth 4 (Four) Times a Day As Needed for Moderate Pain ., Disp: 12 tablet, Rfl: 0  •  methylPREDNISolone (MEDROL) 4 MG dose  "pack, Take as directed on package instructions., Disp: 21 each, Rfl: 0  •  naproxen (EC NAPROSYN) 500 MG EC tablet, Take 1 tablet by mouth 2 (Two) Times a Day As Needed for Mild Pain ., Disp: 12 tablet, Rfl: 0    Past Surgical History:   Procedure Laterality Date   • ACHILLES TENDON REPAIR Left 04/14/2017   • HERNIA REPAIR  2001    Approx.       Social History     Socioeconomic History   • Marital status:      Spouse name: Not on file   • Number of children: Not on file   • Years of education: Not on file   • Highest education level: Not on file   Tobacco Use   • Smoking status: Never Smoker   • Smokeless tobacco: Never Used   Substance and Sexual Activity   • Alcohol use: No   • Drug use: No   • Sexual activity: Defer         Review of Systems   Musculoskeletal: Positive for neck pain and neck stiffness.   Neurological: Positive for weakness, numbness and headaches.   Psychiatric/Behavioral: Positive for sleep disturbance.   All other systems reviewed and are negative.      Objective   Vital Signs: Blood pressure 160/90, temperature 97.6 °F (36.4 °C), height 179.1 cm (70.5\"), weight 117 kg (259 lb).  Physical Exam  Vitals and nursing note reviewed.   Constitutional:       General: He is not in acute distress.     Appearance: He is well-developed.   HENT:      Head: Normocephalic and atraumatic.   Eyes:      Pupils: Pupils are equal, round, and reactive to light.   Cardiovascular:      Heart sounds: Normal heart sounds.   Pulmonary:      Breath sounds: Normal breath sounds.   Psychiatric:         Behavior: Behavior normal.         Thought Content: Thought content normal.     Musculoskeletal:     Strength is intact in upper and lower extremities to direct testing.     Station and gait are normal.  Neurologic:     Muscle tone is normal throughout.     Coordination is intact.     Deep tendon reflexes: 2+ and symmetrical.     Sensation is intact to light touch throughout.     Patient is oriented to person, place, " and time.     Chaitanya sign negative       Independent review of radiographic imaging: CT of the cervical spine demonstrates cervical straightening.  There is degenerative changes most pronounced at C5-6 and C6-7.    Assessment/Plan   Diagnosis: Cervical radiculopathy    Medical Decision Making: Fortunately patient symptoms have improved following several sessions of formal physical therapy. I would like him to continue with therapy. He continues with some mild discomfort in the arm.  He feels ready to return to work.  I will provide him a note to return next week on Monday.  If symptoms recur, he will call our office and I will arrange to have an MRI of the cervical spine without gadolinium.  He can follow-up with me to review.  Otherwise we will follow-up with him as needed basis.    Diagnoses and all orders for this visit:    1. Cervical radiculopathy (Primary)    2. Degeneration of cervical intervertebral disc    3. BMI 36.0-36.9,adult    4. Cervical spondylosis without myelopathy                        Patient's Body mass index is 36.64 kg/m². indicating that he is obese (BMI >30). Obesity-related health conditions include the following: hypertension. Obesity is unchanged. BMI is is above average; BMI management plan is completed. We discussed portion control and increasing exercise..         Sejal Puckett PA-C  Patient Care Team:  Reji Becerra MD as PCP - General (Family Medicine)

## 2022-07-01 ENCOUNTER — OFFICE VISIT (OUTPATIENT)
Dept: SURGERY | Facility: CLINIC | Age: 52
End: 2022-07-01

## 2022-07-01 VITALS
BODY MASS INDEX: 35.53 KG/M2 | DIASTOLIC BLOOD PRESSURE: 78 MMHG | HEIGHT: 70 IN | WEIGHT: 248.2 LBS | SYSTOLIC BLOOD PRESSURE: 128 MMHG | HEART RATE: 78 BPM

## 2022-07-01 DIAGNOSIS — T14.8XXA HEMATOMA AND CONTUSION: Primary | ICD-10-CM

## 2022-07-01 PROCEDURE — 99202 OFFICE O/P NEW SF 15 MIN: CPT | Performed by: SURGERY

## 2022-07-01 PROCEDURE — 10140 I&D HMTMA SEROMA/FLUID COLLJ: CPT | Performed by: SURGERY
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
## 2022-07-02 PROBLEM — T14.8XXA HEMATOMA AND CONTUSION: Status: ACTIVE | Noted: 2022-07-02

## 2022-07-07 ENCOUNTER — OFFICE VISIT (OUTPATIENT)
Dept: SURGERY | Facility: CLINIC | Age: 52
End: 2022-07-07

## 2022-07-07 VITALS
HEART RATE: 85 BPM | BODY MASS INDEX: 35.45 KG/M2 | HEIGHT: 70 IN | WEIGHT: 247.6 LBS | DIASTOLIC BLOOD PRESSURE: 62 MMHG | SYSTOLIC BLOOD PRESSURE: 108 MMHG

## 2022-07-07 DIAGNOSIS — Z51.89 VISIT FOR WOUND CHECK: ICD-10-CM

## 2022-07-07 DIAGNOSIS — T14.8XXA HEMATOMA AND CONTUSION: Primary | ICD-10-CM

## 2022-07-07 DIAGNOSIS — Z51.89 VISIT FOR WOUND CHECK: Primary | ICD-10-CM

## 2022-07-07 PROCEDURE — 87186 SC STD MICRODIL/AGAR DIL: CPT | Performed by: SURGERY

## 2022-07-07 PROCEDURE — 87147 CULTURE TYPE IMMUNOLOGIC: CPT | Performed by: SURGERY

## 2022-07-07 PROCEDURE — 87205 SMEAR GRAM STAIN: CPT | Performed by: SURGERY

## 2022-07-07 PROCEDURE — 87070 CULTURE OTHR SPECIMN AEROBIC: CPT | Performed by: SURGERY

## 2022-07-07 PROCEDURE — 99024 POSTOP FOLLOW-UP VISIT: CPT | Performed by: SURGERY

## 2022-07-07 RX ORDER — DOXYCYCLINE HYCLATE 100 MG/1
100 CAPSULE ORAL 2 TIMES DAILY
Qty: 14 CAPSULE | Refills: 0 | Status: SHIPPED | OUTPATIENT
Start: 2022-07-07 | End: 2022-07-14
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
## 2022-07-09 LAB
BACTERIA SPEC AEROBE CULT: ABNORMAL
GRAM STN SPEC: ABNORMAL
GRAM STN SPEC: ABNORMAL

## 2023-11-07 ENCOUNTER — HOSPITAL ENCOUNTER (EMERGENCY)
Facility: HOSPITAL | Age: 53
Discharge: HOME | End: 2023-11-08
Attending: STUDENT IN AN ORGANIZED HEALTH CARE EDUCATION/TRAINING PROGRAM | Admitting: STUDENT IN AN ORGANIZED HEALTH CARE EDUCATION/TRAINING PROGRAM

## 2023-11-07 ENCOUNTER — APPOINTMENT (OUTPATIENT)
Dept: GENERAL RADIOLOGY | Facility: HOSPITAL | Age: 53
End: 2023-11-07

## 2023-11-07 DIAGNOSIS — R07.89 NON-CARDIAC CHEST PAIN: Primary | ICD-10-CM

## 2023-11-07 LAB
ALBUMIN SERPL-MCNC: 4.7 G/DL (ref 3.5–5.2)
ALBUMIN/GLOB SERPL: 2.1 G/DL
ALP SERPL-CCNC: 98 U/L (ref 39–117)
ALT SERPL W P-5'-P-CCNC: 21 U/L (ref 1–41)
ANION GAP SERPL CALCULATED.3IONS-SCNC: 8.7 MMOL/L (ref 5–15)
AST SERPL-CCNC: 21 U/L (ref 1–40)
BASOPHILS # BLD AUTO: 0.05 10*3/MM3 (ref 0–0.2)
BASOPHILS NFR BLD AUTO: 0.6 % (ref 0–1.5)
BILIRUB SERPL-MCNC: 0.2 MG/DL (ref 0–1.2)
BUN SERPL-MCNC: 14 MG/DL (ref 6–20)
BUN/CREAT SERPL: 16.1 (ref 7–25)
CALCIUM SPEC-SCNC: 9.5 MG/DL (ref 8.6–10.5)
CHLORIDE SERPL-SCNC: 105 MMOL/L (ref 98–107)
CO2 SERPL-SCNC: 27.3 MMOL/L (ref 22–29)
CREAT SERPL-MCNC: 0.87 MG/DL (ref 0.76–1.27)
DEPRECATED RDW RBC AUTO: 41.2 FL (ref 37–54)
EGFRCR SERPLBLD CKD-EPI 2021: 103.8 ML/MIN/1.73
EOSINOPHIL # BLD AUTO: 0.25 10*3/MM3 (ref 0–0.4)
EOSINOPHIL NFR BLD AUTO: 3.1 % (ref 0.3–6.2)
ERYTHROCYTE [DISTWIDTH] IN BLOOD BY AUTOMATED COUNT: 12.5 % (ref 12.3–15.4)
GLOBULIN UR ELPH-MCNC: 2.2 GM/DL
GLUCOSE SERPL-MCNC: 93 MG/DL (ref 65–99)
HCT VFR BLD AUTO: 39.9 % (ref 37.5–51)
HGB BLD-MCNC: 13.2 G/DL (ref 13–17.7)
HOLD SPECIMEN: NORMAL
HOLD SPECIMEN: NORMAL
IMM GRANULOCYTES # BLD AUTO: 0.04 10*3/MM3 (ref 0–0.05)
IMM GRANULOCYTES NFR BLD AUTO: 0.5 % (ref 0–0.5)
LYMPHOCYTES # BLD AUTO: 2.53 10*3/MM3 (ref 0.7–3.1)
LYMPHOCYTES NFR BLD AUTO: 30.9 % (ref 19.6–45.3)
MCH RBC QN AUTO: 30.1 PG (ref 26.6–33)
MCHC RBC AUTO-ENTMCNC: 33.1 G/DL (ref 31.5–35.7)
MCV RBC AUTO: 91.1 FL (ref 79–97)
MONOCYTES # BLD AUTO: 0.64 10*3/MM3 (ref 0.1–0.9)
MONOCYTES NFR BLD AUTO: 7.8 % (ref 5–12)
NEUTROPHILS NFR BLD AUTO: 4.68 10*3/MM3 (ref 1.7–7)
NEUTROPHILS NFR BLD AUTO: 57.1 % (ref 42.7–76)
NRBC BLD AUTO-RTO: 0 /100 WBC (ref 0–0.2)
PLATELET # BLD AUTO: 227 10*3/MM3 (ref 140–450)
PMV BLD AUTO: 9.6 FL (ref 6–12)
POTASSIUM SERPL-SCNC: 3.5 MMOL/L (ref 3.5–5.2)
PROT SERPL-MCNC: 6.9 G/DL (ref 6–8.5)
RBC # BLD AUTO: 4.38 10*6/MM3 (ref 4.14–5.8)
SODIUM SERPL-SCNC: 141 MMOL/L (ref 136–145)
TROPONIN T SERPL HS-MCNC: 8 NG/L
WBC NRBC COR # BLD: 8.19 10*3/MM3 (ref 3.4–10.8)
WHOLE BLOOD HOLD COAG: NORMAL
WHOLE BLOOD HOLD SPECIMEN: NORMAL

## 2023-11-07 PROCEDURE — 93010 ELECTROCARDIOGRAM REPORT: CPT | Performed by: INTERNAL MEDICINE

## 2023-11-07 PROCEDURE — 71045 X-RAY EXAM CHEST 1 VIEW: CPT | Performed by: RADIOLOGY

## 2023-11-07 PROCEDURE — 71045 X-RAY EXAM CHEST 1 VIEW: CPT

## 2023-11-07 PROCEDURE — 99284 EMERGENCY DEPT VISIT MOD MDM: CPT

## 2023-11-07 PROCEDURE — 93005 ELECTROCARDIOGRAM TRACING: CPT | Performed by: STUDENT IN AN ORGANIZED HEALTH CARE EDUCATION/TRAINING PROGRAM

## 2023-11-07 PROCEDURE — 93005 ELECTROCARDIOGRAM TRACING: CPT | Performed by: EMERGENCY MEDICINE

## 2023-11-07 PROCEDURE — 85025 COMPLETE CBC W/AUTO DIFF WBC: CPT | Performed by: PHYSICIAN ASSISTANT

## 2023-11-07 PROCEDURE — 84484 ASSAY OF TROPONIN QUANT: CPT | Performed by: PHYSICIAN ASSISTANT

## 2023-11-07 PROCEDURE — 80053 COMPREHEN METABOLIC PANEL: CPT | Performed by: PHYSICIAN ASSISTANT

## 2023-11-07 RX ORDER — ASPIRIN 81 MG/1
324 TABLET, CHEWABLE ORAL ONCE
Status: COMPLETED | OUTPATIENT
Start: 2023-11-07 | End: 2023-11-07

## 2023-11-07 RX ORDER — SODIUM CHLORIDE 0.9 % (FLUSH) 0.9 %
10 SYRINGE (ML) INJECTION AS NEEDED
Status: DISCONTINUED | OUTPATIENT
Start: 2023-11-07 | End: 2023-11-08 | Stop reason: HOSPADM

## 2023-11-07 RX ADMIN — ASPIRIN 324 MG: 81 TABLET, CHEWABLE ORAL at 22:15

## 2023-11-08 VITALS
WEIGHT: 245 LBS | SYSTOLIC BLOOD PRESSURE: 113 MMHG | TEMPERATURE: 98.4 F | HEIGHT: 70 IN | DIASTOLIC BLOOD PRESSURE: 81 MMHG | OXYGEN SATURATION: 99 % | HEART RATE: 53 BPM | RESPIRATION RATE: 17 BRPM | BODY MASS INDEX: 35.07 KG/M2

## 2023-11-08 LAB
GEN 5 2HR TROPONIN T REFLEX: 8 NG/L
QT INTERVAL: 418 MS
QTC INTERVAL: 420 MS
TROPONIN T DELTA: 0 NG/L

## 2023-11-08 PROCEDURE — 84484 ASSAY OF TROPONIN QUANT: CPT | Performed by: PHYSICIAN ASSISTANT

## 2023-11-08 PROCEDURE — 36415 COLL VENOUS BLD VENIPUNCTURE: CPT

## 2023-11-08 NOTE — ED PROVIDER NOTES
Subjective     History provided by:  Patient  Chest Pain  Pain location:  L chest  Pain quality: stabbing    Pain radiates to:  L arm and neck  Pain severity:  Moderate  Onset quality:  Sudden  Duration:  10 hours  Timing:  Intermittent  Progression:  Improving  Chronicity:  New  Context: at rest    Relieved by:  Nothing  Associated symptoms: heartburn    Associated symptoms: no abdominal pain and no fever    Risk factors: hypertension        Review of Systems   Constitutional: Negative.  Negative for fever.   HENT: Negative.     Respiratory: Negative.     Cardiovascular:  Positive for chest pain.   Gastrointestinal:  Positive for heartburn. Negative for abdominal pain.   Endocrine: Negative.    Genitourinary: Negative.  Negative for dysuria.   Skin: Negative.    Neurological: Negative.    Psychiatric/Behavioral: Negative.     All other systems reviewed and are negative.      Past Medical History:   Diagnosis Date    Anemia     Arthritis     HAND AND KNEE    Hypertension     Obstructive sleep apnea syndrome 8/9/2017    Pneumonia     Poison ivy     Rheumatoid arthritis        Allergies   Allergen Reactions    Penicillins Unknown - Low Severity     UNKNOWN       Past Surgical History:   Procedure Laterality Date    ACHILLES TENDON REPAIR Left 04/14/2017    HERNIA REPAIR  2001    Approx.       Family History   Problem Relation Age of Onset    Heart disease Mother         CARDIAC DISORDER    Hypertension Mother     Hyperlipidemia Mother     Heart disease Father         VALVULAR    Diabetes Maternal Grandmother        Social History     Socioeconomic History    Marital status:    Tobacco Use    Smoking status: Never    Smokeless tobacco: Never   Vaping Use    Vaping Use: Never used   Substance and Sexual Activity    Alcohol use: No    Drug use: No    Sexual activity: Defer           Objective   Physical Exam  Vitals and nursing note reviewed.   Constitutional:       General: He is not in acute distress.      Appearance: He is well-developed. He is not diaphoretic.   HENT:      Head: Normocephalic and atraumatic.      Right Ear: External ear normal.      Left Ear: External ear normal.      Nose: Nose normal.   Eyes:      Conjunctiva/sclera: Conjunctivae normal.   Neck:      Vascular: No JVD.      Trachea: No tracheal deviation.   Cardiovascular:      Rate and Rhythm: Normal rate and regular rhythm.      Heart sounds: Normal heart sounds. No murmur heard.  Pulmonary:      Effort: Pulmonary effort is normal. No respiratory distress.      Breath sounds: Normal breath sounds. No wheezing.   Abdominal:      Palpations: Abdomen is soft.      Tenderness: There is no abdominal tenderness.   Musculoskeletal:         General: No deformity. Normal range of motion.      Cervical back: Normal range of motion and neck supple.   Skin:     General: Skin is warm and dry.      Coloration: Skin is not pale.      Findings: No erythema or rash.   Neurological:      Mental Status: He is alert and oriented to person, place, and time.      Cranial Nerves: No cranial nerve deficit.   Psychiatric:         Behavior: Behavior normal.         Thought Content: Thought content normal.         Procedures           ED Course  ED Course as of 11/08/23 0048   Wed Nov 08, 2023   0038 XR chest rad interpreted:  1.  Heart size at the upper limits of normal.  2.  Mild central pulmonary vascular congestion.  3.  No edema or pneumonia.   4.  No pleural effusion or pneumothorax.  5.  No fracture or foreign body.   [RB]   0047 HEART Score for Major Cardiac Events - MDCalc  Calculated on Nov 08 2023 12:47 AM  2 points -> Low Score (0-3 points) Risk of MACE of 0.9-1.7%. [RB]      ED Course User Index  [RB] Frankie Dobbs II, PA                                           Medical Decision Making  53-year-old male with 10-hour history of on again off again chest pain.    Problems Addressed:  Non-cardiac chest pain: acute illness or injury     Details: Low concern for  cardiac event.  Both troponins are negative.  Chest x-ray does not show any type of acute findings.  Laboratory findings are benign.  Outpatient follow-up with cardiology if needed.    Amount and/or Complexity of Data Reviewed  Labs: ordered.  Radiology: ordered. Decision-making details documented in ED Course.  ECG/medicine tests: ordered.    Risk  OTC drugs.  Prescription drug management.        Final diagnoses:   Non-cardiac chest pain       ED Disposition  ED Disposition       ED Disposition   Discharge    Condition   Stable    Comment   --               Catherine Marie MD  [address] [place], [place] [id number] [phone]    Schedule an appointment as soon as possible for a visit            Medication List        Changed      losartan 50 MG tablet  Commonly known as: COZAAR  Take 1 tablet by mouth Daily.  What changed: how much to take                 Frankie Dobbs II, PA  11/08/23 0048

## 2024-06-11 NOTE — ED PROVIDER NOTES
Subjective   Patient is a 47 y.o. male presenting with shortness of breath.   History provided by:  Patient   used: No    Shortness of Breath   Severity:  Mild  Onset quality:  Sudden  Duration:  1 day  Timing:  Constant  Progression:  Worsening  Chronicity:  New  Context: not activity, not animal exposure, not emotional upset, not known allergens, not occupational exposure, not strong odors, not URI and not weather changes    Relieved by:  Nothing  Worsened by:  Nothing  Ineffective treatments:  None tried  Associated symptoms: no abdominal pain, no chest pain, no claudication, no cough, no diaphoresis, no ear pain, no fever, no headaches, no hemoptysis, no neck pain, no PND, no rash, no sore throat, no sputum production, no syncope, no swollen glands, no vomiting and no wheezing    Risk factors: recent surgery    Risk factors: no recent alcohol use, no family hx of DVT, no hx of cancer, no obesity, no oral contraceptive use, no prolonged immobilization and no tobacco use        Review of Systems   Constitutional: Negative for diaphoresis and fever.   HENT: Negative for ear pain and sore throat.    Eyes: Negative.    Respiratory: Positive for shortness of breath. Negative for cough, hemoptysis, sputum production and wheezing.    Cardiovascular: Negative.  Negative for chest pain, claudication, syncope and PND.   Gastrointestinal: Negative.  Negative for abdominal pain and vomiting.   Endocrine: Negative.    Genitourinary: Negative.    Musculoskeletal: Negative.  Negative for neck pain.   Skin: Negative.  Negative for rash.   Allergic/Immunologic: Negative.    Neurological: Negative.  Negative for headaches.   Hematological: Negative.    Psychiatric/Behavioral: Negative.        Past Medical History:   Diagnosis Date   • Arthritis     HAND AND KNEE   • Hypertension    • Poison ivy        Allergies   Allergen Reactions   • Penicillins      UNKNOWN       Past Surgical History:   Procedure Laterality  Date   • ACHILLES TENDON REPAIR Left 04/14/2017   • HERNIA REPAIR  2001    Approx.       Family History   Problem Relation Age of Onset   • Heart disease Mother      CARDIAC DISORDER   • Hypertension Mother    • Hyperlipidemia Mother    • Heart disease Father      VALVULAR   • Diabetes Maternal Grandmother        Social History     Social History   • Marital status:      Spouse name: N/A   • Number of children: N/A   • Years of education: N/A     Social History Main Topics   • Smoking status: Never Smoker   • Smokeless tobacco: Never Used   • Alcohol use No   • Drug use: No   • Sexual activity: Not Asked     Other Topics Concern   • None     Social History Narrative           Objective   Physical Exam   Constitutional: He is oriented to person, place, and time. He appears well-developed and well-nourished.   HENT:   Head: Normocephalic.   Right Ear: External ear normal.   Left Ear: External ear normal.   Mouth/Throat: Oropharynx is clear and moist.   Eyes: Conjunctivae and EOM are normal. Pupils are equal, round, and reactive to light.   Neck: Normal range of motion. Neck supple.   Cardiovascular: Normal rate and regular rhythm.    Pulmonary/Chest: Effort normal.   Abdominal: Soft. Bowel sounds are normal.   Musculoskeletal: Normal range of motion.   Neurological: He is alert and oriented to person, place, and time.   Skin: Skin is warm and dry.   Psychiatric: He has a normal mood and affect. His behavior is normal.   Nursing note and vitals reviewed.      Procedures         ED Course  ED Course                  MDM    Final diagnoses:   Atypical pneumonia            Marcel Dobbs, APRN  04/23/17 2028     no no